# Patient Record
Sex: FEMALE | Race: WHITE | NOT HISPANIC OR LATINO | URBAN - METROPOLITAN AREA
[De-identification: names, ages, dates, MRNs, and addresses within clinical notes are randomized per-mention and may not be internally consistent; named-entity substitution may affect disease eponyms.]

---

## 2018-08-30 ENCOUNTER — HOSPITAL ENCOUNTER (INPATIENT)
Facility: HOSPITAL | Age: 83
LOS: 4 days | DRG: 184 | End: 2018-09-03
Attending: SURGERY | Admitting: SURGERY
Payer: COMMERCIAL
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
## 2018-08-30 PROBLEM — S22.49XA RIB FRACTURES: Status: ACTIVE | Noted: 2018-08-30

## 2018-08-30 LAB
ABO + RH BLD: NORMAL
ALBUMIN SERPL-MCNC: 2.8 G/DL (ref 3.4–5)
ALP SERPL-CCNC: 98 IU/L (ref 35–126)
ALT SERPL-CCNC: 55 IU/L (ref 11–54)
ANION GAP SERPL CALC-SCNC: 8 MEQ/L (ref 3–15)
APTT PPP: 36 SEC. (ref 23–35)
AST SERPL-CCNC: 76 IU/L (ref 15–41)
BACTERIA URNS QL MICRO: ABNORMAL /UL
BILIRUB SERPL-MCNC: 1.4 MG/DL (ref 0.3–1.2)
BILIRUB UR QL STRIP.AUTO: NEGATIVE MG/DL
BLD GP AB SCN SERPL QL: NEGATIVE
BUN SERPL-MCNC: 16 MG/DL (ref 8–20)
CALCIUM SERPL-MCNC: 8.5 MG/DL (ref 8.9–10.3)
CHLORIDE SERPL-SCNC: 104 MMOL/L (ref 98–109)
CLARITY UR REFRACT.AUTO: CLEAR
CO2 SERPL-SCNC: 24 MMOL/L (ref 22–32)
COLOR UR AUTO: ABNORMAL
CREAT SERPL-MCNC: 0.9 MG/DL (ref 0.6–1.1)
D AG BLD QL: POSITIVE
ERYTHROCYTE [DISTWIDTH] IN BLOOD BY AUTOMATED COUNT: 14.3 % (ref 11.7–14.4)
GFR SERPL CREATININE-BSD FRML MDRD: 59.9 ML/MIN/1.73M*2
GLUCOSE SERPL-MCNC: 130 MG/DL (ref 70–99)
GLUCOSE UR STRIP.AUTO-MCNC: NEGATIVE MG/DL
HCT VFR BLDCO AUTO: 35 % (ref 35–45)
HGB BLD-MCNC: 11.7 G/DL (ref 11.8–15.7)
HGB UR QL STRIP.AUTO: NEGATIVE
HYALINE CASTS #/AREA URNS LPF: ABNORMAL /LPF
INR PPP: 5.3 INR
KETONES UR STRIP.AUTO-MCNC: NEGATIVE MG/DL
LABORATORY COMMENT REPORT: NORMAL
LACTATE BLDA-SCNC: 1.5 MMOL/L (ref 0.4–1.6)
LEUKOCYTE ESTERASE UR QL STRIP.AUTO: NEGATIVE
MAGNESIUM SERPL-MCNC: 2.2 MG/DL (ref 1.8–2.5)
MCH RBC QN AUTO: 29.3 PG (ref 28–33.2)
MCHC RBC AUTO-ENTMCNC: 33.4 G/DL (ref 32.2–35.5)
MCV RBC AUTO: 87.5 FL (ref 83–98)
MRSA DNA SPEC QL NAA+PROBE: NEGATIVE
NITRITE UR QL STRIP.AUTO: NEGATIVE
PDW BLD AUTO: 10.3 FL (ref 9.4–12.3)
PH UR STRIP.AUTO: 7 [PH]
PHOSPHATE SERPL-MCNC: 3.5 MG/DL (ref 2.4–4.7)
PLATELET # BLD AUTO: 219 K/UL (ref 150–369)
POTASSIUM SERPL-SCNC: 3.8 MMOL/L (ref 3.6–5.1)
PROT SERPL-MCNC: 5.2 G/DL (ref 6–8.2)
PROT UR QL STRIP.AUTO: 2
PROTHROMBIN TIME: 50.1 SEC. (ref 12.2–14.5)
RBC # BLD AUTO: 4 M/UL (ref 3.93–5.22)
RBC #/AREA URNS HPF: ABNORMAL /HPF
SODIUM SERPL-SCNC: 136 MMOL/L (ref 136–144)
SP GR UR REFRACT.AUTO: 1.02
SQUAMOUS URNS QL MICRO: 1 /HPF
UROBILINOGEN UR STRIP-ACNC: 1 EU/DL
WBC # BLD AUTO: 17.36 K/UL (ref 3.8–10.5)
WBC #/AREA URNS HPF: ABNORMAL /HPF

## 2018-08-30 PROCEDURE — 84100 ASSAY OF PHOSPHORUS: CPT | Performed by: PHYSICIAN ASSISTANT

## 2018-08-30 PROCEDURE — 94640 AIRWAY INHALATION TREATMENT: CPT

## 2018-08-30 PROCEDURE — 83605 ASSAY OF LACTIC ACID: CPT | Performed by: PHYSICIAN ASSISTANT

## 2018-08-30 PROCEDURE — 85610 PROTHROMBIN TIME: CPT | Performed by: PHYSICIAN ASSISTANT

## 2018-08-30 PROCEDURE — 83735 ASSAY OF MAGNESIUM: CPT | Performed by: PHYSICIAN ASSISTANT

## 2018-08-30 PROCEDURE — 85730 THROMBOPLASTIN TIME PARTIAL: CPT | Performed by: PHYSICIAN ASSISTANT

## 2018-08-30 PROCEDURE — 85027 COMPLETE CBC AUTOMATED: CPT | Performed by: PHYSICIAN ASSISTANT

## 2018-08-30 PROCEDURE — 93005 ELECTROCARDIOGRAM TRACING: CPT | Performed by: PHYSICIAN ASSISTANT

## 2018-08-30 PROCEDURE — 63700000 HC SELF-ADMINISTRABLE DRUG

## 2018-08-30 PROCEDURE — 86900 BLOOD TYPING SEROLOGIC ABO: CPT

## 2018-08-30 PROCEDURE — 87086 URINE CULTURE/COLONY COUNT: CPT | Performed by: PHYSICIAN ASSISTANT

## 2018-08-30 PROCEDURE — 25000000 HC PHARMACY GENERAL: Performed by: PHYSICIAN ASSISTANT

## 2018-08-30 PROCEDURE — 81001 URINALYSIS AUTO W/SCOPE: CPT | Performed by: PHYSICIAN ASSISTANT

## 2018-08-30 PROCEDURE — 87641 MR-STAPH DNA AMP PROBE: CPT | Performed by: PHYSICIAN ASSISTANT

## 2018-08-30 PROCEDURE — 63600000 HC DRUGS/DETAIL CODE: Performed by: PHYSICIAN ASSISTANT

## 2018-08-30 PROCEDURE — 80053 COMPREHEN METABOLIC PANEL: CPT | Performed by: PHYSICIAN ASSISTANT

## 2018-08-30 PROCEDURE — 36415 COLL VENOUS BLD VENIPUNCTURE: CPT | Performed by: PHYSICIAN ASSISTANT

## 2018-08-30 PROCEDURE — 20000000 HC ROOM AND CARE ICU

## 2018-08-30 PROCEDURE — 25800000 HC PHARMACY IV SOLUTIONS: Performed by: PHYSICIAN ASSISTANT

## 2018-08-30 RX ORDER — POLYETHYLENE GLYCOL 3350 17 G/17G
17 POWDER, FOR SOLUTION ORAL DAILY
COMMUNITY
End: 2022-08-30

## 2018-08-30 RX ORDER — ACETAMINOPHEN 325 MG/1
650 TABLET ORAL EVERY 6 HOURS
Status: DISCONTINUED | OUTPATIENT
Start: 2018-08-30 | End: 2018-09-02

## 2018-08-30 RX ORDER — ALBUTEROL SULFATE 0.83 MG/ML
2.5 SOLUTION RESPIRATORY (INHALATION) 4 TIMES DAILY
COMMUNITY
End: 2018-09-09

## 2018-08-30 RX ORDER — ONDANSETRON HYDROCHLORIDE 2 MG/ML
4 INJECTION, SOLUTION INTRAVENOUS EVERY 8 HOURS PRN
Status: DISCONTINUED | OUTPATIENT
Start: 2018-08-30 | End: 2018-09-03 | Stop reason: HOSPADM

## 2018-08-30 RX ORDER — HYDROMORPHONE HYDROCHLORIDE 2 MG/1
2 TABLET ORAL EVERY 4 HOURS PRN
Status: DISCONTINUED | OUTPATIENT
Start: 2018-08-30 | End: 2018-09-03 | Stop reason: HOSPADM

## 2018-08-30 RX ORDER — ZOLPIDEM TARTRATE 5 MG/1
5 TABLET ORAL NIGHTLY
COMMUNITY

## 2018-08-30 RX ORDER — HEPARIN SODIUM 5000 [USP'U]/ML
5000 INJECTION, SOLUTION INTRAVENOUS; SUBCUTANEOUS EVERY 8 HOURS
Status: DISCONTINUED | OUTPATIENT
Start: 2018-08-30 | End: 2018-08-30

## 2018-08-30 RX ORDER — TIOTROPIUM BROMIDE 18 UG/1
1 CAPSULE ORAL; RESPIRATORY (INHALATION) DAILY
Status: DISCONTINUED | OUTPATIENT
Start: 2018-08-31 | End: 2018-08-31

## 2018-08-30 RX ORDER — POTASSIUM CHLORIDE 14.9 MG/ML
20 INJECTION INTRAVENOUS AS NEEDED
Status: DISCONTINUED | OUTPATIENT
Start: 2018-08-30 | End: 2018-08-31

## 2018-08-30 RX ORDER — DILTIAZEM HYDROCHLORIDE EXTENDED-RELEASE TABLETS 120 MG/1
120 TABLET, EXTENDED RELEASE ORAL 3 TIMES DAILY
COMMUNITY

## 2018-08-30 RX ORDER — POTASSIUM CHLORIDE 750 MG/1
20 TABLET, FILM COATED, EXTENDED RELEASE ORAL AS NEEDED
Status: DISCONTINUED | OUTPATIENT
Start: 2018-08-30 | End: 2018-09-03

## 2018-08-30 RX ORDER — SODIUM CHLORIDE 9 MG/ML
5 INJECTION, SOLUTION INTRAVENOUS AS NEEDED
Status: DISCONTINUED | OUTPATIENT
Start: 2018-08-30 | End: 2018-08-31

## 2018-08-30 RX ORDER — DEXTROSE 50 % IN WATER (D50W) INTRAVENOUS SYRINGE
25 AS NEEDED
Status: DISCONTINUED | OUTPATIENT
Start: 2018-08-30 | End: 2018-09-03 | Stop reason: HOSPADM

## 2018-08-30 RX ORDER — ROSUVASTATIN CALCIUM 40 MG/1
40 TABLET, COATED ORAL NIGHTLY
COMMUNITY
End: 2022-09-02 | Stop reason: HOSPADM

## 2018-08-30 RX ORDER — ROSUVASTATIN CALCIUM 40 MG/1
40 TABLET, COATED ORAL DAILY
Status: DISCONTINUED | OUTPATIENT
Start: 2018-08-31 | End: 2018-09-03 | Stop reason: HOSPADM

## 2018-08-30 RX ORDER — DEXTROSE 40 %
15-30 GEL (GRAM) ORAL AS NEEDED
Status: DISCONTINUED | OUTPATIENT
Start: 2018-08-30 | End: 2018-08-31

## 2018-08-30 RX ORDER — FENTANYL CITRATE 50 UG/ML
25 INJECTION, SOLUTION INTRAMUSCULAR; INTRAVENOUS ONCE
Status: COMPLETED | OUTPATIENT
Start: 2018-08-30 | End: 2018-08-30

## 2018-08-30 RX ORDER — POTASSIUM CHLORIDE 750 MG/1
40 TABLET, FILM COATED, EXTENDED RELEASE ORAL AS NEEDED
Status: DISCONTINUED | OUTPATIENT
Start: 2018-08-30 | End: 2018-09-03

## 2018-08-30 RX ORDER — FENTANYL CITRATE 50 UG/ML
INJECTION, SOLUTION INTRAMUSCULAR; INTRAVENOUS
Status: COMPLETED
Start: 2018-08-30 | End: 2018-08-30

## 2018-08-30 RX ORDER — POLYETHYLENE GLYCOL 3350 17 G/17G
17 POWDER, FOR SOLUTION ORAL DAILY
Status: DISCONTINUED | OUTPATIENT
Start: 2018-08-31 | End: 2018-09-03 | Stop reason: HOSPADM

## 2018-08-30 RX ORDER — LORATADINE 10 MG/1
10 TABLET ORAL DAILY
Status: DISCONTINUED | OUTPATIENT
Start: 2018-08-31 | End: 2018-09-03 | Stop reason: HOSPADM

## 2018-08-30 RX ORDER — IBUPROFEN 200 MG
16-32 TABLET ORAL AS NEEDED
Status: DISCONTINUED | OUTPATIENT
Start: 2018-08-30 | End: 2018-08-31

## 2018-08-30 RX ORDER — WARFARIN 2.5 MG/1
2.5 TABLET ORAL EVERY EVENING
COMMUNITY

## 2018-08-30 RX ORDER — AMOXICILLIN 250 MG
1 CAPSULE ORAL 2 TIMES DAILY
Status: DISCONTINUED | OUTPATIENT
Start: 2018-08-30 | End: 2018-09-03 | Stop reason: HOSPADM

## 2018-08-30 RX ORDER — ACETAMINOPHEN 325 MG/1
TABLET ORAL
Status: COMPLETED
Start: 2018-08-30 | End: 2018-08-30

## 2018-08-30 RX ORDER — TIOTROPIUM BROMIDE 18 UG/1
1 CAPSULE ORAL; RESPIRATORY (INHALATION) DAILY
COMMUNITY
End: 2022-08-30 | Stop reason: ALTCHOICE

## 2018-08-30 RX ORDER — FLUTICASONE FUROATE AND VILANTEROL 100; 25 UG/1; UG/1
1 POWDER RESPIRATORY (INHALATION) DAILY
COMMUNITY
End: 2022-08-30 | Stop reason: ALTCHOICE

## 2018-08-30 RX ORDER — ALBUTEROL SULFATE 0.83 MG/ML
2.5 SOLUTION RESPIRATORY (INHALATION) 4 TIMES DAILY
Status: DISCONTINUED | OUTPATIENT
Start: 2018-08-30 | End: 2018-09-03 | Stop reason: HOSPADM

## 2018-08-30 RX ORDER — LIDOCAINE 560 MG/1
1 PATCH PERCUTANEOUS; TOPICAL; TRANSDERMAL DAILY
Status: DISCONTINUED | OUTPATIENT
Start: 2018-08-31 | End: 2018-09-03 | Stop reason: HOSPADM

## 2018-08-30 RX ORDER — FLUTICASONE FUROATE AND VILANTEROL 100; 25 UG/1; UG/1
1 POWDER RESPIRATORY (INHALATION) DAILY
Status: DISCONTINUED | OUTPATIENT
Start: 2018-08-31 | End: 2018-09-03 | Stop reason: HOSPADM

## 2018-08-30 RX ORDER — HYDROMORPHONE HYDROCHLORIDE 4 MG/1
4 TABLET ORAL EVERY 4 HOURS PRN
Status: DISCONTINUED | OUTPATIENT
Start: 2018-08-30 | End: 2018-09-03 | Stop reason: HOSPADM

## 2018-08-30 RX ORDER — MINERAL OIL
180 ENEMA (ML) RECTAL DAILY
COMMUNITY
End: 2022-08-30

## 2018-08-30 RX ORDER — ZOLPIDEM TARTRATE 5 MG/1
5 TABLET ORAL NIGHTLY PRN
Status: DISCONTINUED | OUTPATIENT
Start: 2018-08-30 | End: 2018-09-03 | Stop reason: HOSPADM

## 2018-08-30 RX ADMIN — FENTANYL CITRATE 25 MCG: 50 INJECTION INTRAMUSCULAR; INTRAVENOUS at 20:51

## 2018-08-30 RX ADMIN — ACETAMINOPHEN 650 MG: 325 TABLET ORAL at 19:41

## 2018-08-30 RX ADMIN — SODIUM CHLORIDE 1000 ML: 9 INJECTION, SOLUTION INTRAVENOUS at 23:56

## 2018-08-30 RX ADMIN — ALBUTEROL SULFATE 2.5 MG: 2.5 SOLUTION RESPIRATORY (INHALATION) at 22:24

## 2018-08-30 ASSESSMENT — COGNITIVE AND FUNCTIONAL STATUS - GENERAL: DO YOU HAVE SERIOUS DIFFICULTY WALKING OR CLIMBING STAIRS: AMBULATION DIFFICULTY, REQUIRES EQUIPMENT

## 2018-08-31 ENCOUNTER — APPOINTMENT (INPATIENT)
Dept: RADIOLOGY | Facility: HOSPITAL | Age: 83
DRG: 184 | End: 2018-08-31
Attending: PHYSICIAN ASSISTANT
Payer: COMMERCIAL

## 2018-08-31 LAB
ABO + RH BLD: NORMAL
ANION GAP SERPL CALC-SCNC: 12 MEQ/L (ref 3–15)
APTT PPP: 29 SEC. (ref 23–35)
ATRIAL RATE: 71
ATRIAL RATE: 73
BUN SERPL-MCNC: 11 MG/DL (ref 8–20)
CALCIUM SERPL-MCNC: 8.6 MG/DL (ref 8.9–10.3)
CHLORIDE SERPL-SCNC: 104 MMOL/L (ref 98–109)
CO2 SERPL-SCNC: 23 MMOL/L (ref 22–32)
CREAT SERPL-MCNC: 0.9 MG/DL (ref 0.6–1.1)
D AG BLD QL: POSITIVE
ERYTHROCYTE [DISTWIDTH] IN BLOOD BY AUTOMATED COUNT: 14.6 % (ref 11.7–14.4)
GFR SERPL CREATININE-BSD FRML MDRD: 59.9 ML/MIN/1.73M*2
GLUCOSE SERPL-MCNC: 125 MG/DL (ref 70–99)
HCT VFR BLDCO AUTO: 38.2 % (ref 35–45)
HGB BLD-MCNC: 12.2 G/DL (ref 11.8–15.7)
INR PPP: 1.4 INR
INR PPP: 1.7 INR
INR PPP: 5.2 INR
LABORATORY COMMENT REPORT: NORMAL
MAGNESIUM SERPL-MCNC: 2.3 MG/DL (ref 1.8–2.5)
MCH RBC QN AUTO: 28.5 PG (ref 28–33.2)
MCHC RBC AUTO-ENTMCNC: 31.9 G/DL (ref 32.2–35.5)
MCV RBC AUTO: 89.3 FL (ref 83–98)
P AXIS: 41
P AXIS: 50
PDW BLD AUTO: 11 FL (ref 9.4–12.3)
PHOSPHATE SERPL-MCNC: 3.5 MG/DL (ref 2.4–4.7)
PLATELET # BLD AUTO: 203 K/UL (ref 150–369)
POTASSIUM SERPL-SCNC: 4.2 MMOL/L (ref 3.6–5.1)
PR INTERVAL: 152
PR INTERVAL: 158
PROTHROMBIN TIME: 16.8 SEC. (ref 12.2–14.5)
PROTHROMBIN TIME: 19.9 SEC. (ref 12.2–14.5)
PROTHROMBIN TIME: 49.2 SEC. (ref 12.2–14.5)
QRS DURATION: 110
QRS DURATION: 116
QT INTERVAL: 396
QT INTERVAL: 426
QTC CALCULATION(BAZETT): 436
QTC CALCULATION(BAZETT): 462
R AXIS: 36
R AXIS: 39
RBC # BLD AUTO: 4.28 M/UL (ref 3.93–5.22)
SODIUM SERPL-SCNC: 139 MMOL/L (ref 136–144)
T WAVE AXIS: 79
T WAVE AXIS: 80
TROPONIN I SERPL-MCNC: <0.03 NG/ML
VENTRICULAR RATE: 71
VENTRICULAR RATE: 73
WBC # BLD AUTO: 15.25 K/UL (ref 3.8–10.5)

## 2018-08-31 PROCEDURE — 93005 ELECTROCARDIOGRAM TRACING: CPT | Performed by: SURGERY

## 2018-08-31 PROCEDURE — 74177 CT ABD & PELVIS W/CONTRAST: CPT

## 2018-08-31 PROCEDURE — 25000000 HC PHARMACY GENERAL: Performed by: PHYSICIAN ASSISTANT

## 2018-08-31 PROCEDURE — 36430 TRANSFUSION BLD/BLD COMPNT: CPT

## 2018-08-31 PROCEDURE — 94640 AIRWAY INHALATION TREATMENT: CPT

## 2018-08-31 PROCEDURE — 63600105 HC IODINE BASED CONTRAST: Performed by: PHYSICIAN ASSISTANT

## 2018-08-31 PROCEDURE — P9059 PLASMA, FRZ BETWEEN 8-24HOUR: HCPCS

## 2018-08-31 PROCEDURE — 36415 COLL VENOUS BLD VENIPUNCTURE: CPT | Performed by: PHYSICIAN ASSISTANT

## 2018-08-31 PROCEDURE — 25800000 HC PHARMACY IV SOLUTIONS: Performed by: PHYSICIAN ASSISTANT

## 2018-08-31 PROCEDURE — 63700000 HC SELF-ADMINISTRABLE DRUG: Performed by: PHYSICIAN ASSISTANT

## 2018-08-31 PROCEDURE — 85610 PROTHROMBIN TIME: CPT | Performed by: PHYSICIAN ASSISTANT

## 2018-08-31 PROCEDURE — 85730 THROMBOPLASTIN TIME PARTIAL: CPT | Performed by: PHYSICIAN ASSISTANT

## 2018-08-31 PROCEDURE — 63600000 HC DRUGS/DETAIL CODE: Performed by: PHYSICIAN ASSISTANT

## 2018-08-31 PROCEDURE — 71045 X-RAY EXAM CHEST 1 VIEW: CPT

## 2018-08-31 PROCEDURE — 63600000 HC DRUGS/DETAIL CODE

## 2018-08-31 PROCEDURE — 63700000 HC SELF-ADMINISTRABLE DRUG: Performed by: NURSE PRACTITIONER

## 2018-08-31 PROCEDURE — 84100 ASSAY OF PHOSPHORUS: CPT | Performed by: PHYSICIAN ASSISTANT

## 2018-08-31 PROCEDURE — 85027 COMPLETE CBC AUTOMATED: CPT | Performed by: PHYSICIAN ASSISTANT

## 2018-08-31 PROCEDURE — 83735 ASSAY OF MAGNESIUM: CPT | Performed by: PHYSICIAN ASSISTANT

## 2018-08-31 PROCEDURE — 84484 ASSAY OF TROPONIN QUANT: CPT | Performed by: PHYSICIAN ASSISTANT

## 2018-08-31 PROCEDURE — 97162 PT EVAL MOD COMPLEX 30 MIN: CPT | Mod: GP

## 2018-08-31 PROCEDURE — 20000000 HC ROOM AND CARE ICU

## 2018-08-31 PROCEDURE — 80048 BASIC METABOLIC PNL TOTAL CA: CPT | Performed by: PHYSICIAN ASSISTANT

## 2018-08-31 PROCEDURE — 25000000 HC PHARMACY GENERAL

## 2018-08-31 PROCEDURE — 97165 OT EVAL LOW COMPLEX 30 MIN: CPT | Mod: GO

## 2018-08-31 RX ORDER — FENTANYL CITRATE 50 UG/ML
INJECTION, SOLUTION INTRAMUSCULAR; INTRAVENOUS
Status: COMPLETED
Start: 2018-08-31 | End: 2018-08-31

## 2018-08-31 RX ORDER — WARFARIN 2.5 MG/1
2.5 TABLET ORAL
Status: DISCONTINUED | OUTPATIENT
Start: 2018-09-01 | End: 2018-09-01

## 2018-08-31 RX ORDER — HYDRALAZINE HYDROCHLORIDE 20 MG/ML
10 INJECTION INTRAMUSCULAR; INTRAVENOUS ONCE
Status: COMPLETED | OUTPATIENT
Start: 2018-08-31 | End: 2018-08-31

## 2018-08-31 RX ORDER — FENTANYL CITRATE 50 UG/ML
25 INJECTION, SOLUTION INTRAMUSCULAR; INTRAVENOUS ONCE
Status: COMPLETED | OUTPATIENT
Start: 2018-08-31 | End: 2018-08-31

## 2018-08-31 RX ORDER — DILTIAZEM HYDROCHLORIDE 180 MG/1
360 CAPSULE, COATED, EXTENDED RELEASE ORAL DAILY
Status: DISCONTINUED | OUTPATIENT
Start: 2018-08-31 | End: 2018-08-31

## 2018-08-31 RX ORDER — DILTIAZEM HYDROCHLORIDE 180 MG/1
360 CAPSULE, COATED, EXTENDED RELEASE ORAL DAILY
Status: DISCONTINUED | OUTPATIENT
Start: 2018-09-01 | End: 2018-09-03 | Stop reason: HOSPADM

## 2018-08-31 RX ORDER — SODIUM CHLORIDE 9 MG/ML
5 INJECTION, SOLUTION INTRAVENOUS AS NEEDED
Status: DISCONTINUED | OUTPATIENT
Start: 2018-08-31 | End: 2018-08-31

## 2018-08-31 RX ORDER — WARFARIN SODIUM 5 MG/1
5 TABLET ORAL 3 TIMES WEEKLY
Status: COMPLETED | OUTPATIENT
Start: 2018-08-31 | End: 2018-08-31

## 2018-08-31 RX ORDER — TIOTROPIUM BROMIDE 18 UG/1
1 CAPSULE ORAL; RESPIRATORY (INHALATION) DAILY
Status: DISCONTINUED | OUTPATIENT
Start: 2018-08-31 | End: 2018-09-03 | Stop reason: HOSPADM

## 2018-08-31 RX ORDER — LORAZEPAM 2 MG/ML
1 INJECTION INTRAMUSCULAR
Status: DISCONTINUED | OUTPATIENT
Start: 2018-08-31 | End: 2018-09-03

## 2018-08-31 RX ORDER — LABETALOL HYDROCHLORIDE 5 MG/ML
INJECTION, SOLUTION INTRAVENOUS
Status: COMPLETED
Start: 2018-08-31 | End: 2018-08-31

## 2018-08-31 RX ORDER — LABETALOL HYDROCHLORIDE 5 MG/ML
10 INJECTION, SOLUTION INTRAVENOUS ONCE
Status: COMPLETED | OUTPATIENT
Start: 2018-08-31 | End: 2018-08-31

## 2018-08-31 RX ADMIN — Medication 2.5 MG/HR: at 05:30

## 2018-08-31 RX ADMIN — ZOLPIDEM TARTRATE 5 MG: 5 TABLET, FILM COATED ORAL at 20:54

## 2018-08-31 RX ADMIN — ALBUTEROL SULFATE 2.5 MG: 2.5 SOLUTION RESPIRATORY (INHALATION) at 20:28

## 2018-08-31 RX ADMIN — PHYTONADIONE 3 MG: 10 INJECTION, EMULSION INTRAMUSCULAR; INTRAVENOUS; SUBCUTANEOUS at 02:26

## 2018-08-31 RX ADMIN — HYDROMORPHONE HYDROCHLORIDE 2 MG: 2 TABLET ORAL at 21:20

## 2018-08-31 RX ADMIN — FENTANYL CITRATE 25 MCG: 50 INJECTION INTRAMUSCULAR; INTRAVENOUS at 04:58

## 2018-08-31 RX ADMIN — HYDROMORPHONE HYDROCHLORIDE 4 MG: 4 TABLET ORAL at 03:52

## 2018-08-31 RX ADMIN — ALBUTEROL SULFATE 2.5 MG: 2.5 SOLUTION RESPIRATORY (INHALATION) at 17:48

## 2018-08-31 RX ADMIN — ACETAMINOPHEN 650 MG: 325 TABLET ORAL at 09:30

## 2018-08-31 RX ADMIN — LABETALOL HYDROCHLORIDE 10 MG: 5 INJECTION INTRAVENOUS at 03:14

## 2018-08-31 RX ADMIN — IOHEXOL 125 ML: 300 INJECTION, SOLUTION INTRAVENOUS at 01:47

## 2018-08-31 RX ADMIN — ROSUVASTATIN CALCIUM 40 MG: 40 TABLET, FILM COATED ORAL at 09:34

## 2018-08-31 RX ADMIN — ALBUTEROL SULFATE 2.5 MG: 2.5 SOLUTION RESPIRATORY (INHALATION) at 08:29

## 2018-08-31 RX ADMIN — HYDROMORPHONE HYDROCHLORIDE 4 MG: 4 TABLET ORAL at 09:34

## 2018-08-31 RX ADMIN — ACETAMINOPHEN 650 MG: 325 TABLET ORAL at 20:53

## 2018-08-31 RX ADMIN — ACETAMINOPHEN 650 MG: 325 TABLET ORAL at 15:40

## 2018-08-31 RX ADMIN — FLUTICASONE FUROATE AND VILANTEROL TRIFENATATE 1 PUFF: 100; 25 POWDER RESPIRATORY (INHALATION) at 08:29

## 2018-08-31 RX ADMIN — ONDANSETRON 4 MG: 2 INJECTION, SOLUTION INTRAMUSCULAR; INTRAVENOUS at 17:56

## 2018-08-31 RX ADMIN — ALBUTEROL SULFATE 2.5 MG: 2.5 SOLUTION RESPIRATORY (INHALATION) at 13:30

## 2018-08-31 RX ADMIN — LABETALOL HYDROCHLORIDE 10 MG: 5 INJECTION, SOLUTION INTRAVENOUS at 03:14

## 2018-08-31 RX ADMIN — HYDROMORPHONE HYDROCHLORIDE 2 MG: 2 TABLET ORAL at 00:06

## 2018-08-31 RX ADMIN — SENNOSIDES AND DOCUSATE SODIUM 1 TABLET: 8.6; 5 TABLET ORAL at 19:35

## 2018-08-31 RX ADMIN — HYDRALAZINE HYDROCHLORIDE 10 MG: 20 INJECTION INTRAMUSCULAR; INTRAVENOUS at 04:21

## 2018-08-31 RX ADMIN — WARFARIN SODIUM 5 MG: 5 TABLET ORAL at 19:36

## 2018-08-31 RX ADMIN — FENTANYL CITRATE 25 MCG: 50 INJECTION, SOLUTION INTRAMUSCULAR; INTRAVENOUS at 04:58

## 2018-08-31 RX ADMIN — TIOTROPIUM BROMIDE 1 CAPSULE: 18 CAPSULE ORAL; RESPIRATORY (INHALATION) at 02:52

## 2018-08-31 RX ADMIN — HYDROMORPHONE HYDROCHLORIDE 4 MG: 4 TABLET ORAL at 14:25

## 2018-08-31 RX ADMIN — LIDOCAINE 1 PATCH: 246 PATCH TOPICAL at 09:33

## 2018-08-31 ASSESSMENT — COGNITIVE AND FUNCTIONAL STATUS - GENERAL
MOVING TO AND FROM BED TO CHAIR: 3 - A LITTLE
EATING MEALS: 4 - NONE
TOILETING: 3 - A LITTLE
CLIMB 3 TO 5 STEPS WITH RAILING: 3 - A LITTLE
DRESSING REGULAR UPPER BODY CLOTHING: 3 - A LITTLE
DRESSING REGULAR LOWER BODY CLOTHING: 3 - A LITTLE
WALKING IN HOSPITAL ROOM: 3 - A LITTLE
STANDING UP FROM CHAIR USING ARMS: 3 - A LITTLE
HELP NEEDED FOR BATHING: 3 - A LITTLE
HELP NEEDED FOR PERSONAL GROOMING: 3 - A LITTLE

## 2018-08-31 NOTE — PROGRESS NOTES
Spoke with pt's Cardiologist's (Dr. Bailey [phone]) office and RN, Siri.  Pt was recently seen at [facility] on 8/20/2018 for CP and SOB.  Her INR was 4.6 then.  Her goal with her mech valve is 2.5 - 3.5.  She has it checked monthly and as per the office has been regimen with it.  She has not had difficulty with supratherapeutuc INRs and no hx of frequent falls as per their knowledge.  They have faxed over her INR monthly  records which will be placed on her chart for reference.  They did request upon DC to SNF the SNF check her  INR  and fax to Dr. Bailey's office and to keep her regularly scheduled appointments.

## 2018-08-31 NOTE — CONSULTS
Physical Medicine and Rehabilitation Consult Note    Subjective     Bonny Howe is a 83 y.o. female who was admitted for Rib fractures [S22.39XA]. We were asked to see for rehabilitation needs.     Patient is a 83-year-old woman with a past medical history significant for hypertension, hyperlipidemia, aortic valve replacement, chronic anticoagulation, COPD, bilateral total hip replacements and irritable bowel syndrome.She was transferred to the [facility] on 8-30-18 from an outside hospital status post a down several stairs at home.  She landed on her left side describes it as a mechanical fall without loss of consciousness.  Diagnostic workup remarkable for multiple left-sided rib fractures numbers 2 through 7 with ribs 4 and 5 with 2 fractures each.  Chronic appearing T8 compression fracture as well.  Currently being treated conservatively, nonsurgically.    Medical History: No past medical history on file.    Surgical History: No past surgical history on file.    Social History:   Social History     Social History Narrative   • No narrative on file     Lives with:   Her daughter in a multilevel dwelling.  Her  with Alzheimer's is there as well.  He has a home health aide from 9 PM to 9 AM daily  Prior Function Level: Function Level Prior  Ambulation: independent  Transferring: independent  Toileting: independent  Bathing: independent  Dressing: independent  Eating: independent  Communication: understands/communicates without difficulty  Swallowing: swallows foods/liquids without difficulty  Equipment Currently Used at Home: none  Family History: No family history on file.  History also provided by: Nursing staff  Allergies: Codeine and Penicillins      acetaminophen 650 mg oral q6h SULTANA   albuterol 2.5 mg nebulization QID   fluticasone-vilanterol 1 puff inhalation Daily   lidocaine 1 patch Topical Daily   loratadine 10 mg oral Daily   polyethylene glycol 17 g oral Daily   rosuvastatin 40 mg  oral Daily   sennosides-docusate sodium 1 tablet oral BID   tiotropium 1 capsule inhalation Daily        Medication List      CONTINUE taking these medications    albuterol 2.5 mg /3 mL (0.083 %) nebulizer solution  Take 2.5 mg by nebulization 4 (four) times a day.     diltiazem  mg 24 hr tablet  Commonly known as:  CARDIZEM LA  Take 360 mg by mouth daily.     fexofenadine 180 mg tablet  Commonly known as:  ALLEGRA  Take 180 mg by mouth daily.     fluticasone-vilanterol 100-25 mcg/dose powder for inhalation  Commonly known as:  BREO ELLIPTA  Inhale 1 puff daily. Rinse mouth with water after use to reduce aftertaste and incidence of candidiasis. Do not swallow.     polyethylene glycol 17 gram packet  Commonly known as:  MIRALAX  Take 17 g by mouth daily.     rosuvastatin 40 mg tablet  Commonly known as:  CRESTOR  Take 40 mg by mouth daily.     tiotropium 18 mcg per inhalation capsule  Commonly known as:  SPIRIVA  Place 1 capsule into inhaler and inhale daily.     warfarin 2.5 mg tablet  Commonly known as:  COUMADIN  Take 2.5 mg by mouth once daily. 5mg every Monday, Wednesday, Friday; takes 2.5mg all other days.     zolpidem 5 mg tablet  Commonly known as:  AMBIEN  Take 12.5 mg by mouth nightly as needed for sleep.            REVIEW OF SYSTEMS:  CONSTITUTIONAL: Admits to poor appetite and weight loss  PULMONARY: Status post tobacco  RHEUMATOLOGIC: No joint pain currently no back pain positive left-sided posterior chest wall pain  NEUROLOGIC: Denies numbness and tingling  PSYCHIATRIC: Positive history of daily EtOH    Remainder of eleven point review of systems is unremarkable.      Objective   Labs  I have reviewed the patient's labs.  Significant abnormals are Elevated LFTs.  Lab Results   Component Value Date    WBC 15.25 (H) 08/31/2018    HGB 12.2 08/31/2018    HCT 38.2 08/31/2018     08/31/2018    ALT 55 (H) 08/30/2018    AST 76 (H) 08/30/2018     08/31/2018    K 4.2 08/31/2018      08/31/2018    CREATININE 0.9 08/31/2018    BUN 11 08/31/2018    CO2 23 08/31/2018    INR 1.7 08/31/2018     Imaging  Chest x-ray one view is reviewed remarkable for mild left lower lobe consolidation and small left pleural effusion.  Nondisplaced left rib fracture    PHYSICAL EXAM: 143 pounds, BMI is 27  Vitals:    08/31/18 0800   BP: (!) 161/69   Pulse: 92   Resp: 16   Temp: 36.6 °C (97.8 °F)   SpO2: 92%     GENERAL: Well-developed frail woman in no acute distress mildly disheveled                                                                                                                           HENT: Normocephalic atraumatic moist mucous membranes sclera nonicteric  LUNGS: Clear respirations unlabored , mildly decreased left lower  CARDIAC: Irregularly irregular rhythm no gallops or rubs no JVD no pedal edema  ABDOMEN: Soft nontender nondistended positive bowel sounds  EXTREMITIES: No atrophy no increased tone  RHEUMATOLOGIC: No joint effusion no warmth  SPINE: Mild dorsal kyphosis, no significant tenderness in the midline of the thoracic spine.  Positive tenderness to palpation of the posterior thorax.  DERMATOLOGIC: No rash no breakdown  PSYCHIATRIC: Affect is appropriate she is cooperative  NEUROLOGIC: Alert and oriented ×3, cranial nerves are symmetrical, cerebellar is intact, sensation is intact, deep tendon reflexes are symmetrical.  Motor examination 5/5.  MOBILITY: Moderate assistance with bed mobility, poor sitting tolerance secondary to pain    ASSESSMENT/PLAN:    1.  Fall, mechanical in nature with secondary left-sided rib fractures.  Currently being treated conservatively, nonsurgically.  Pain is controlled at rest however significant with bed mobility.    2.  COPD, status post recent acute exacerbation with hospitalization approximately 2 weeks ago.  Currently being treated with albuterol nebulizer, Spiriva.  Stable O2 saturations on nasal cannula O2.    3.  Chronic atrial fibrillation,  chronically anticoagulated with Coumadin.  At admission  with elevated INR and LFTs, she is status post vitamin K and FFP, anticoagulation is on hold.  She is rate controlled at rest but with bed mobility as more rapid ventricular response likely associated pain.    4.  Mobility deficit, acute secondary to pain.  Historically her functional mobility was good and she did not require an assistive device.  She should be mobilized steadily    5.  Rehabilitation/disposition, anticipate need for skilled rehabilitation prior to discharge home with her elderly  with dementia.        No new Assessment & Plan notes have been filed under this hospital service since the last note was generated.  Service: Physical Medicine and Rehabilitation      Plan of care was discussed with patient

## 2018-08-31 NOTE — PROGRESS NOTES
"Brief Nutrition Note    Recommendations   Add ensure compact BID    Nutrition Charting Type: Nutrition Brief Assessment    Clinical Course: Patient is a 83 y.o. female who was admitted on 8/30/2018 with a diagnosis of Rib fractures [S22.39XA].     No past medical history on file.  No past surgical history on file.    General Information  Nutrition Evaluation/Patient Follow-Up: Mildly Nutritionally Compromised-Follow up in 4-6 days     MST Nutrition Screen Tool  Has patient lost weight without trying?: 0-->Yes  If yes,how much weight has been lost?: 1-->2-13 pounds  Has patient been eating poorly due to decreased appetite?: 1-->Yes  MST Nutrition Screen Score: 2     Physical Appearance  Last Bowel Movement: 08/30/18 (per pt)     Nutrition Order Review  Nutrition Order Review: meets nutritional requirements  Nutrition Order Review Comments: regular      Anthropometrics  Height: 154.9 cm (5' 1\")     Current Weight  Weight: 59.3 kg (130 lb 11.7 oz)     Ideal Body Weight (IBW)  Ideal Body Weight (IBW) (kg): 48.15  % Ideal Body Weight: 123.17     Body Mass Index (BMI)  BMI (Calculated): 24.7     Lab Results  Lab Results Reviewed: reviewed, pertinent   BMP Results       08/31/18 08/30/18                       0501 2016           136          K 4.2 3.8          Cl 104 104          CO2 23 24          Glucose 125 (H) 130 (H)          BUN 11 16          Creatinine 0.9 0.9          Calcium 8.6 (L) 8.5 (L)           Pertinent Medications  Pertinent Medications Reviewed: reviewed, pertinent   Miralax, crestor, senokot     Skin: intact     Clinical comments:  Pts diet just advanced, waiting for breakfast, Reports appetite fair lately, just ill with COPD. Thinks she has had a 5lb/ 3% wt loss over past month or so. Agree with regular diet. Recommend ensure compact BID     Monitor: PO intake, plan of care  Goals: meet 75% of needs via oral intake       Recommendations: See above       Date: 08/31/18  Signature: Camryn " DEIDRA Rojo

## 2018-08-31 NOTE — PLAN OF CARE
Problem: Patient Care Overview  Goal: Plan of Care Review  Outcome: Ongoing (interventions implemented as appropriate)   08/31/18 6520   Coping/Psychosocial   Plan Of Care Reviewed With patient   Plan of Care Review   Progress improving   Outcome Summary Cardene off; O2 titrated to 1 L; OOB to Chair.

## 2018-08-31 NOTE — PLAN OF CARE
Problem: Patient Care Overview  Goal: Plan of Care Review  Outcome: Ongoing (interventions implemented as appropriate)   08/31/18 1428   Coping/Psychosocial   Plan Of Care Reviewed With patient   Plan of Care Review   Progress progress toward functional goals as expected   Outcome Summary OT evaluation, expect steady progress as pt to return home w/ home health and family to assist as needed.        Problem: Self-Care Deficit (Adult,Obstetrics,Pediatric)  Goal: Identify Related Risk Factors and Signs and Symptoms  Outcome: Outcome(s) Achieved Date Met: 08/31/18 08/31/18 1428   Self-Care Deficit   Related Risk Factors (Self-Care Deficit) activity intolerance;fatigue/weakness   Signs and Symptoms (Self-Care Deficit) decreased functional activity tolerance;painful movement;inability/unwillingness to perform self-care;unable to perform dressing/grooming tasks;unable to perform toileting/toilet hygiene tasks;unable to perform bathing/hygiene tasks       Problem: Acute Therapy Services Goal & Intervention Plan  Goal: Bathing Goal  Outcome: Ongoing (interventions implemented as appropriate)    Goal: Grooming Goal  Outcome: Ongoing (interventions implemented as appropriate)    Goal: Lower Body Dressing Goal  Outcome: Ongoing (interventions implemented as appropriate)    Goal: Transfer Training Goal  Outcome: Ongoing (interventions implemented as appropriate)    Goal: Upper Body Dressing Goal  Outcome: Ongoing (interventions implemented as appropriate)

## 2018-08-31 NOTE — PROGRESS NOTES
"Trauma and SICU attending:    Patient seen and evaluated.  Data labs imaging reviewed.    Due to the potential for acute decompensation specifically cardiopulmonary related, ICU level of care continues.    Neuro: Awake alert and interactive.  No focal neurologic deficit.    Cardiovascular: History of apparent mechanical aortic valve replacement, and with supra therapeutic coagulopathy at admission (INR 5.3).  Given concern for hemorrhage at site of extensive left chest trauma, as well as potential for visceral injury otherwise, small dose of vitamin K and FFP indicated trying to bring the supratherapeutic coagulopathy into a more therapeutic range.  Her INR in fact near normalized after, and without apparent complication.  If her hemodynamics, and hemoglobin remain stable, Coumadin to be resumed this evening.  We have communicated with her primary care physician, who had managed her Coumadin prior, specifically to apprise him of the variance in the INR checked by him last week (2.5 reportedly) and compared to ours hours at admission (5.3).    Respiratory: Acute respiratory insufficiency from blunt chest trauma with multiple left-sided rib fractures.  No indication of hemopneumothorax. CT done at her transferring facility and unable to be viewed in \"lung\" windows for lung parenchyma assessment.  Nevertheless her chest x-ray appears to show an underlying pulmonary contusion as well.  Titrated FiO2 , pulmonary recruitment maneuvers, ISP use, and multimodal analgesia appropriate to avoid decompensation.  Of note, no indication for rib fixation at this time however this needs to be reconsidered based on respiratory status.  Separately but also complicating care significnatly, is the patient's baseline COPD, and current pre-admission COPD exacerbation.  We have continued her respiratory related medications including her recent pulse steroids.  Pulmonary consultation will be effected if COPD related respiratory " insufficiency does not improve.    GI: Suspected chronic protein calorie malnutrition, with enteral intake appropriate monitor.  No indication of visceral injury on exam or CT imaging.    : Stable renal function.  Glucose and electrolytes ensured appropriate as well.    Heme: Serial hemoglobin planned to ensure stability and see complexity to case as discussed above, with supratherapeutic INR and now status post notable blunt chest trauma.  Platelet count stable.  Supratherapeutic acquired coagulopathy from Coumadin as discussed above.    I provided 35 minutes of critical care services to support this life/organ threatening illness. This time reflects daily cumulative, direct time spent in the care and direct coordination of care of the patient throughout the day by the attending physician, excluding separately billable services, procedures, teaching time and time spent by non-physician providers.

## 2018-08-31 NOTE — PLAN OF CARE
Problem: Patient Care Overview  Goal: Discharge Needs Assessment   08/31/18 1698   NC Needs Assessment   Concerns To Be Addressed discharge planning concerns   Readmission Within The Last 30 Days no previous admission in last 30 days   Anticipated Discharge Disposition home with home health services   Type of Home Care Services home PT   Equipment Needed After Discharge none   Current Health   Anticipated Changes Related to Illness inability to care for self   Activity/Self Care ROS   Equipment Currently Used at Home walker, rolling

## 2018-08-31 NOTE — PROGRESS NOTES
"As per MD Gomez, pt admitted with multiple L sided rib fractures, and COPD exacerbation. Anticipate pt medically stable for dc within 1-2 days. PT/OT evaluated pt and recommend home with home care when stable. SW consulted for trauma.     SW met with pt and pt dtr/Sal at bedside. Pt reports she tripped at home walking down 3 steps. Pt denies ETOH or illicit substance use. Pt reports she resides with her  and her dtr in a H. Pt and pt spouse have a first floor set up with no ANAYA. Pt uses a walker at home and is primarily independent with all ADLs.     Pt confirmed PCP: Dr. Parker Rodriguez; Pharmacy: [facility]. Pt does not have a HCPOA but states her dtr/Anna Mccormack p: [phone] to be contacted in case of an emergency. Pt reports she feels safe returning home and is \"unsure\" if she wants home care. Requests SW revisit this prior to dc.  Emotional support provided. Pt reports family will transport home. SW continues to follow for emotional support and dispo planning. Camryn Montenegro, JAY   "

## 2018-08-31 NOTE — H&P
TRAUMA SURGERY CONSULT     PATIENT NAME:  Bonny Howe YOB: 1935    AGE:  83 y.o.  GENDER: female   MRN:  [id number]  PATIENT #: [id number]       CHIEF COMPLAINT: Left sided chest wall pain    Time Notified: 18:45 Time Patient Seen: 19:50     HISTORY OF INJURY   Patient is an 82 yo F transferred from [facility] for left sided rib fractures (2-7; fractures in 2 placed in ribs 4-5). Patient was coming down 3 steps when she tripped and fell. She did not hit her head and denies LOC. She landed on her left chest wall. Since fall she has had left sided chest wall pain for which she presented to [facility] and was found to have multiple left sided rib fractures. She has a history of COPD and was currently being treated with a prednisone taper for a flare up on Sunday.     REVIEW OF SYSTEMS   13 point review of systems completed. Negative except for above mentioned history.    PAST MEDICAL HISTORY   COPD -> recent exacerbation on Sunday  Hypertension  Aortic valve replacement (on Warfarin managed by Dr. Bailey at [place])  Hyperlipidemia  IBS    PAST SURGICAL HISTORY   Bilateral hip replacements     FAMILY HISTORY   Non-contributory    SOCIAL HISTORY     Social History     Social History   • Marital status:      Spouse name: N/A   • Number of children: N/A   • Years of education: N/A     Occupational History   • Not on file.     Social History Main Topics   • Smoking status: Former Smoker     Types: Cigarettes   • Smokeless tobacco: Former User      Comment: quit 40 years ago   • Alcohol use 1.2 oz/week     2 Standard drinks or equivalent per week   • Drug use: No   • Sexual activity: Not on file     Other Topics Concern   • Not on file     Social History Narrative   • No narrative on file     tobacco: former smoker, EtOH: 2-4 drinks daily, drugs: denies    MEDICATIONS     Current Facility-Administered Medications:   •  acetaminophen (TYLENOL) tablet 650 mg, 650 mg, oral, q6h [facility],  Ericka Brannon PA, 650 mg at 08/30/18 1941  •  albuterol nebulizer solution 2.5 mg, 2.5 mg, nebulization, QID, Ericka Brannon PA, 2.5 mg at 08/30/18 2224  •  calcium gluconate 1,000 mg in sodium chloride 0.9 % 50 mL IVPB, 1 g, intravenous, PRN, Ericka Brannon PA  •  glucose chewable tablet 16-32 g of dextrose, 16-32 g of dextrose, oral, PRN **OR** dextrose 40 % oral gel 15-30 g of dextrose, 15-30 g of dextrose, oral, PRN **OR** glucagon (GLUCAGEN) injection 1 mg, 1 mg, intramuscular, PRN **OR** dextrose in water injection 12.5 g, 25 mL, intravenous, PRN, Ericka Brannon PA  •  fluticasone-vilanterol (BREO ELLIPTA) 100-25 mcg/dose powder for inhalation 1 puff, 1 puff, inhalation, Daily, Ericka Brannon PA  •  HYDROmorphone (DILAUDID) tablet 2 mg, 2 mg, oral, q4h PRN, Ericka Brannon PA, 2 mg at 08/31/18 0006  •  HYDROmorphone (DILAUDID) tablet 4 mg, 4 mg, oral, q4h PRN, Ericka Brannon PA  •  lidocaine (ASPERCREME) 4 % topical patch 1 patch, 1 patch, Topical, Daily, Ericka Brannon PA  •  loratadine (CLARITIN) tablet 10 mg, 10 mg, oral, Daily, Ericka Brannon PA  •  ondansetron (ZOFRAN) injection 4 mg, 4 mg, intravenous, q8h PRN, Ericka Brannon PA  •  phytonadione (vitamin K1) (AQUA-MEPHYTON) 3 mg in sodium chloride 0.9 % 50 mL IVPB, 3 mg, intravenous, Once, Ericka Brannon PA  •  polyethylene glycol (MIRALAX) 17 gram packet 17 g, 17 g, oral, Daily, Ericka Brannon PA  •  potassium chloride (KLOR-CON) tablet extended release 20 mEq, 20 mEq, oral, PRN **OR** potassium chloride (KLOR-CON) tablet extended release 40 mEq, 40 mEq, oral, PRN **OR** potassium chloride 20 mEq in 100 mL IVPB  (premix), 20 mEq, intravenous, PRN **OR** potassium chloride 40 mEq in sodium chloride 0.9 % 250 mL IVPB, 40 mEq, intravenous, PRN, Ericka Brannon, PA  •  rosuvastatin (CRESTOR) tablet 40 mg, 40 mg, oral, Daily, Ericka Brannon, PA  •  sennosides-docusate sodium (SENOKOT-S)  8.6-50 mg per tablet 1 tablet, 1 tablet, oral, BID, Ericka Brannon PA  •  sodium chloride 0.9 % bolus 1,000 mL, 1,000 mL, intravenous, Once, Ericka Brannon PA, Last Rate: 500 mL/hr at 08/30/18 2356, 1,000 mL at 08/30/18 2356  •  sodium chloride 0.9 % infusion, 5 mL/hr, intravenous, PRN, Ericka Brannon, PA  •  sodium chloride 0.9 % infusion, 5 mL/hr, intravenous, PRN, Ericka Brannon, PA  •  tiotropium (SPIRIVA) 18 mcg per inhalation capsule 1 capsule, 1 capsule, inhalation, Daily, Ericka Brannon PA  •  zolpidem (AMBIEN) tablet 5 mg, 5 mg, oral, Nightly PRN, Ericka Brannon PA  Tetanus:  Not indicated    ALLERGIES     Allergies   Allergen Reactions   • Codeine GI intolerance   • Penicillins Other (see comments)     Severe stomach pains.       PRIMARY CARE PHYSICIAN   Parker Rodriguez MD (Inactive)    DIAGNOSTIC DATA   LABS:  Recent Results (from the past 24 hour(s))   MRSA Screen, Nares Only    Collection Time: 08/30/18  8:05 PM   Result Value Ref Range    MRSA DNA, Nares Negative Negative   CBC    Collection Time: 08/30/18  8:16 PM   Result Value Ref Range    WBC 17.36 (H) 3.80 - 10.50 K/uL    RBC 4.00 3.93 - 5.22 M/uL    Hemoglobin 11.7 (L) 11.8 - 15.7 g/dL    Hematocrit 35.0 35.0 - 45.0 %    MCV 87.5 83.0 - 98.0 fL    MCH 29.3 28.0 - 33.2 pg    MCHC 33.4 32.2 - 35.5 g/dL    RDW 14.3 11.7 - 14.4 %    Platelets 219 150 - 369 K/uL    MPV 10.3 9.4 - 12.3 fL   Comprehensive metabolic panel    Collection Time: 08/30/18  8:16 PM   Result Value Ref Range    Sodium 136 136 - 144 mmol/L    Potassium 3.8 3.6 - 5.1 mmol/L    Chloride 104 98 - 109 mmol/L    CO2 24 22 - 32 mmol/L    BUN 16 8 - 20 mg/dL    Creatinine 0.9 0.6 - 1.1 mg/dL    Glucose 130 (H) 70 - 99 mg/dL    Calcium 8.5 (L) 8.9 - 10.3 mg/dL    AST (SGOT) 76 (H) 15 - 41 IU/L    ALT (SGPT) 55 (H) 11 - 54 IU/L    Alkaline Phosphatase 98 35 - 126 IU/L    Total Protein 5.2 (L) 6.0 - 8.2 g/dL    Albumin 2.8 (L) 3.4 - 5.0 g/dL    Bilirubin,  Total 1.4 (H) 0.3 - 1.2 mg/dL    eGFR 59.9 (L) >=60.0 mL/min/1.73m*2    Anion Gap 8 3 - 15 mEQ/L   Protime-INR    Collection Time: 08/30/18  8:16 PM   Result Value Ref Range    PT 50.1 (H) 12.2 - 14.5 Sec.    INR 5.3 <6.0 INR   APTT    Collection Time: 08/30/18  8:16 PM   Result Value Ref Range    PTT 36 (H) 23 - 35 Sec.   Lactate    Collection Time: 08/30/18  8:16 PM   Result Value Ref Range    Lactate, Art 1.5 0.4 - 1.6 mmol/L   Phosphorus    Collection Time: 08/30/18  8:16 PM   Result Value Ref Range    Phosphorus 3.5 2.4 - 4.7 mg/dL   Magnesium    Collection Time: 08/30/18  8:16 PM   Result Value Ref Range    Magnesium 2.2 1.8 - 2.5 mg/dL   Type and Screen [facility] Lab    Collection Time: 08/30/18  8:16 PM   Result Value Ref Range    Antibody Screen Negative     ABO A     Rh Factor Positive     History Check No type on file    Protime-INR    Collection Time: 08/30/18 11:22 PM   Result Value Ref Range    PT 49.2 (H) 12.2 - 14.5 Sec.    INR 5.2 <6.0 INR   UA Hold for UC (Macro)    Collection Time: 08/30/18 11:34 PM   Result Value Ref Range    Color, Urine Aicha (A) Yellow    Clarity, Urine Clear Clear    Specific Gravity, Urine 1.017 1.002 - 1.030    pH, Urine 7.0 4.5 - 8.0    Leukocyte Esterase Negative Negative    Nitrite, Urine Negative Negative    Protein, Urine +2 (A) Negative    Glucose, Urine Negative Negative mg/dL    Ketones, Urine Negative Negative mg/dL    Urobilinogen, Urine 1.0 <2.0 EU/dL EU/dL    Bilirubin, Urine Negative Negative mg/dL    Blood, Urine Negative Negative   UA Microscopic    Collection Time: 08/30/18 11:34 PM   Result Value Ref Range    RBC, Urine 0 TO 4 0 TO 4 /hpf    WBC, Urine 4 TO 10 (A) 0 TO 3 /hpf    Squamous Epithelial +1 (A) None Seen /hpf    Hyaline Cast 0 TO 2 (A) None Seen /lpf    Bacteria, Urine None Seen None Seen /uL       IMAGING:  No results found.   OSH: CTH - no acute intracranial hemorrhage or fracture   OSH: CT Chest - Left sided 2-7th rib fxs 4th and 5th rib fractures  "in 2 placed. Thickening left chest wall suggesting intramuscular hematoma. Small opacity lingula, small pulmonary contusion no excluded. T8 compression fracture, likely chronic. Colonic diverticulosis.     PHYSICAL EXAM   BP (!) 151/72   Pulse 81   Temp 36.3 °C (97.3 °F) (Temporal)   Resp 20   Ht 1.549 m (5' 1\")   Wt 65.2 kg (143 lb 11.8 oz)   SpO2 96%   BMI 27.16 kg/m²      NEURO: A&Ox3, CN II-XII grossly intact. No focal neuro deficit. GCS 15  HEENT: Face symmetric, atraumatic, pupils 3 mm round and reactive bilaterally, EOMI; EACs clear bilaterally. Nares are clear. Lips and oral mucosa pink, moist and intact. Trachea midline. Tongue midline.   SPINE: Cervical spine nontender. T/L/S spine nontender, no stepoffs/deformities.   CHEST: Symmetric expansion, Left sided chest wall tenderness, no crepitus.   LUNGS: Clear to auscultation bilaterally. No wheezes, rhonchi or crackles noted. No use of accessory muscles or respiratory distress.   HEART: S1/S2, RRR  ABDOMEN: Soft, nontender, nondistended. (+) bowel sounds  PELVIS: Stable, nontender.   : Genitalia unremarkable.   RECTAL: Deferred  EXTREMITIES: No gross deformities. 2+ radial/DP pulses. 5/5 , bicep, tricep, dorsi/plantarflexion; sensation intact to light touch.  SKIN: warm, dry        IMPRESSION/PLAN     Consultant  Name & Service Emergent  Urgent or   Routine Time Paged Name & Time Responded Name & Time Arrived                        *emergent requires <30 minutes response on site; urgent requires <12 hours response on site     83 y.o. y/o female s/p mechanical fall at home with left sided chest wall pain and multiple left sided rib fractures.   1. Left sided rib fractures - Pain control. Respiratory monitoring. IS 10x/hr while awake (inital 750). AM CXR.  2. Follow up CT abdomen/pelvis.   3. Elevated INR (5.3/5.2) - Patient takes warfarin for aortic valve replacement normally managed by Dr. Bailey at [place] with usual INR goal of 2.5 per " patient. She denies changing her medication dosing or diet. Only new mediation per patient was prednisone for recent COPD exacerbation. Plan to give Vitamin K 3mg IV and 2 units of FFP to bring INR closer to target range.   4. COPD exacerbation - close respiratory monitoring. Patient does not remember what dose of prednisone she was taking, awaiting confirmation of medication from patient's daughters to continue taper. Spiriva, Breo and albuterol reordered.   5. Leukocytosis - Patient afebrile, UA without concern for UTI. Patient reports taking prednisone this week. Will trend WBCs, leukocytosis may be reactive and related to recent prednisone use.   6. T8 compression fracture noted on outside hospital records likely chronic. Patient without tenderness to palpation, suspect this is a chronic finding.       AUTHOR:  ALINA Rocha  Trauma Service pager #[phone], [phone]  8/31/2018  1:12 AM

## 2018-08-31 NOTE — PROGRESS NOTES
Patient: Bonny Howe  Location: [facility] SICU SICU11  MRN: [id number]  Today's date: 8/31/2018     [place] w/ PT please seee there vital sign section. Patient left in chair w/ call bell in reach, chair alarm, RN notified.          Therapy Pain/Vitals     Row Name 08/31/18 1250             Pain/Comfort/Sleep    Presence of Pain complains of pain/discomfort      Preferred Pain Scale number (Numeric Rating Pain Scale)      Pain Body Location - Side Left      Pain Body Location - Orientation posterior      Pain Rating (0-10): Rest 7      Pain Rating (0-10): Activity 8            Prior Living Environment  Lives With: spouse, child(dilshad), adult (spouse, daughter, CHANTE)  Living Arrangements: house  Home Accessibility: stairs to enter home, stairs within home  Living Environment Comment: 1STE w/ bedroom and stall shower on 1st level Equipment Currently Used at Home: none (owns RW, gb's elevated toilet, WC, shower chair)       Prior Level of Function  Ambulation: independent  Transferring: independent  Toileting: independent  Bathing: independent  Dressing: independent  Eating: independent  Communication: understands/communicates without difficulty  Swallowing: swallows foods/liquids without difficulty  Equipment Currently Used at Home: none (owns RW, gb's elevated toilet, WC, shower chair)  Prior Functional Level Comment: Independent ADLS, IADLs, caregiver for            OT Evaluation - 08/31/18 1255        Session Details    Document Type initial evaluation    Mode of Treatment occupational therapy       Time Calculation    Start Time 1221    Stop Time 1245    Time Calculation (min) 24 min       General Information    Patient Profile Reviewed? yes    Pertinent History of Current Functional Problem mechanical fall down 3 steps, L 2-7th rib fractures, w/ 4-5 fxs in 2 segments   PMH: COPD, HTN, AVR       Orientation Log    City 3-->spontaneous/free recall    Kind of Place 3-->spontaneous/free recall     Name of Hospital 3-->spontaneous/free recall    Month 3-->spontaneous/free recall    Date 3-->spontaneous/free recall    Year 3-->spontaneous/free recall    Day of Week 3-->spontaneous/free recall    Clock Time 3-->spontaneous/free recall    Etiology/Event 3-->spontaneous/free recall    Pathology Deficits 3-->spontaneous/free recall    Total Score 30       Cognition/Psychosocial    Safety Awareness intact       Range of Motion (ROM)    General Range of Motion no range of motion deficits identified       Manual Muscle Testing (MMT)    General MMT Assessment no strength deficits identified       Bed Mobility    Bed Mobility supine to sit    Lynn, Roll Left supervision    Lynn, Roll Right supervision    Lynn, Supine to Sit minimum assist (75% patient effort);1 person assist    Assistive Device (Bed Mobility) bed rails       Bed to Chair Transfer    Lynn, Bed to Chair close supervision       Sit to Stand Transfer    Lynn, Sit to Stand Transfer close supervision       Stand to Sit Transfer    Lynn, Stand to Sit Transfer close supervision       Upper Body Dressing    Upper Body Dressing Tasks don;pajama/robe    Upper Body Dressing Position edge of bed sitting    Upper Body Dressing Lynn minimum assist (75% or more patient effort);1 person assist       Lower Body Dressing    Lower Body Dressing Tasks don;socks    Lower Body Dressing Position edge of bed sitting    Concerns (Lower Body Dressing) difficulty utilizing one-handed methods    Comment increased difficulty crossing LE, using one hand unilateral side,        Toileting    Toileting Position supine    Lynn, Toileting supervision    Comment bridging performed bladder hygiene       AM-PAC (TM) - ADL (Current Function)    Putting on and taking off regular lower body clothing? 3 - A Little    Bathing? 3 - A Little    Toileting? 3 - A Little    Putting on/taking off regular upper body clothing? 3 - A Little     How much help for taking care of personal grooming? 3 - A Little    Eating meals? 4 - None    AM-PAC (TM) ADL Score 19       OT Clinical Impression    Patient's Goals For Discharge return to all previous roles/activities    Plan For Care Reviewed: Occupational Therapy OT plan for care discussed with patient    Impairments Found (OT Eval) aerobic capacity/endurance;ergonomics and body mechanics;gait, locomotion, and balance    Rehab Potential/Prognosis: Occupational Therapy good, to achieve stated therapy goals    OT Frequency of Treatment 5-7 times per week    Anticipated Equipment Needs at Discharge reacher    Expected Discharge Disposition home with home health;home with assist    Daily Outcome Statement Pt presents w/ minimal ADL and functional mobility deficits, expect steady progress and patient to return home w/ homehealth and assist from family.                         Education provided this session. See the Patient Education summary report for full details.    OT Care Plan Goals      Most Recent Value   Bathing Goal   Date Goal Established: Bathing  08/31/18   Time to Achieve Goal: Bathing  5 - 7 days   Level of [place] Goal: Bathing  independent   Goal Outcome: Bathing  goal ongoing   Grooming Goal   Date Goal Established: Grooming  08/31/18   Time to Achieve Goal: Grooming  5 - 7 days   Level of Jewell Goal: Grooming  independent   Goal Outcome: Grooming  goal ongoing   Lower Body Dressing Goal   Date Goal Established: Lower Body Dressing  08/31/18   Time to Achieve Goal: Lower Body Dressing  5 - 7 days   Level of Jewell  independent   Goal Outcome: Lower Body Dressing  goal ongoing   Transfer Goal   Date Goal Established: Transfer Training  08/31/18   Time to Achieve Goal: Transfer Training  5 - 7 days   Goal Activity: Transfer Training  all transfers   Level of Jewell Goal: Transfer Training  independent   Goal Outcome: Transfer Training  goal ongoing   Upper Body Dressing Goal    Date Goal Established: Upper Body Dressing  08/31/18   Time to Achieve Goal: Upper Body Dressing  5 - 7 days   Level of [place]  independent   Goal Outcome: Upper Body Dressing  goal ongoing

## 2018-08-31 NOTE — PROGRESS NOTES
Patient: Bonny Howe  Location: [facility] SICU SICU11  MRN: [id number]  Today's date: 8/31/2018     Patient left seated upright in chair at bedside in NAD, VSS, call bell and personal items within reach. RN aware. Patient is supervision level for mobility, rec home with PT and family assist at discharge.           Therapy Pain/Vitals - 08/31/18 1250        Pain/Comfort/Sleep    Presence of Pain complains of pain/discomfort    Preferred Pain Scale number (Numeric Rating Pain Scale)    Pain Body Location - Side Left    Pain Body Location - Orientation posterior    Pain Rating (0-10): Rest 7    Pain Rating (0-10): Activity 8          Prior Living Environment  Lives With: spouse, child(dilshad), adult (spouse, daughter, CHANTE)  Living Arrangements: house  Home Accessibility: stairs to enter home, stairs within home  Living Environment Comment: 1STE w/ bedroom and stall shower on 1st level Equipment Currently Used at Home: walker, rolling       Prior Level of Function  Ambulation: independent  Transferring: independent  Toileting: independent  Bathing: independent  Dressing: independent  Eating: independent  Communication: understands/communicates without difficulty  Swallowing: swallows foods/liquids without difficulty  Equipment Currently Used at Home: walker, rolling  Prior Functional Level Comment: Independent ADLS, IADLs, caregiver for            PT Evaluation - 08/31/18 1222        Session Details    Document Type initial evaluation    Mode of Treatment physical therapy       Time Calculation    Start Time 1222    Stop Time 1245    Time Calculation (min) 23 min       General Information    Patient Profile Reviewed? yes    Onset of Illness/Injury or Date of Surgery 08/30/18    Referring Physician Dr. Burnett     Pertinent History of Current Functional Problem s/p mechanical fall down 3  steps, transfer from [facility]. L 2-7th rib fx, 4-5th fx in 2 segments    Existing  Precautions/Restrictions no known precautions/restrictions       Cognition/Psychosocial    Safety Awareness intact       Range of Motion (ROM)    General Range of Motion no range of motion deficits identified       Manual Muscle Testing (MMT)    General MMT Assessment no strength deficits identified       Bed Mobility    Bed Mobility supine to sit    Volin, Roll Left supervision    Volin, Roll Right supervision    Volin, Supine to Sit minimum assist (75% patient effort)    Assistive Device (Bed Mobility) bed rails       Bed to Chair Transfer    Volin, Bed to Chair close supervision    Assistive Device walker, front-wheeled       Chair to Bed Transfer    Volin, Chair to Bed close supervision    Assistive Device walker, front-wheeled       Gait Training    Volin, Gait close supervision    Assistive Device walker, front-wheeled    Distance in Feet 14 feet    Gait Pattern Utilized swing-through    Gait Deviations Identified decreased mckenzie;decreased step length   endurance limited due to pain        Stairs Training    Volin, Stairs not tested       AM-PAC (TM) - Mobility (Current Function)    Turning from your back to your side while in a flat bed without using bedrails? 3 - A Little    Moving from lying on your back to sitting on the side of a flat bed without using bedrails? 3 - A Little    Moving to and from a bed to a chair? 3 - A Little    Standing up from a chair using your arms? 3 - A Little    To walk in a hospital room? 3 - A Little    Climbing 3-5 steps with a railing? 3 - A Little    AM-PAC (TM) Mobility Score 18       PT Clinical Impression    Patient's Goals For Discharge return home    Plan For Care Reviewed: Physical Therapy patient feedback incorporated in PT plan for care    Impairments Found (PT Eval) aerobic capacity/endurance;gait, locomotion, and balance    Rehab Potential/Prognosis good, to achieve stated therapy goals    PT Frequency of Treatment  5 times per week    Problem List impaired balance;decreased strength   decreased endurance    Activity Limitations Related to Problem List functional mobility not performed adequately or safely for community activity    Anticipated Equipment Needs at Discharge none   patient has RW For home use     Expected Discharge Disposition home with home health;home with assist   patient states she will have 24/7 assist at home from family    Daily Outcome Statement 8/31/2018: patient is supervision level for mobility. rec home with PT at discharge. will have 24/7 assist from family                         Education provided this session. See the Patient Education summary report for full details.    PT Care Plan Goals      Most Recent Value   Bed Mobility Goal   Time to Achieve Goal: Bed Mobility  by discharge   Goal Activity: Bed Mobility  all bed mobility activities   Level of Pitcher Goal: Bed Mobility  supervision required   Goal Outcome: Bed Mobility  goal ongoing   Gait Goal   Time to Achieve Goal: Gait Training  by discharge   Level of Pitcher  independent   Assistive Device: Gait Training  crutches, axillary   Distance Goal: Gait Training (feet)  100 feet   Goal Outcome: Gait Training  goal ongoing   Transfer Goal   Date Goal Established: Transfer Training  08/31/18   Time to Achieve Goal: Transfer Training  by discharge   Goal Activity: Transfer Training  all transfers   Level of Pitcher Goal: Transfer Training  independent   Assistive Device: Transfer Training  walker, rolling   Goal Outcome: Transfer Training  goal ongoing

## 2018-08-31 NOTE — PLAN OF CARE
Problem: Patient Care Overview  Goal: Plan of Care Review  Outcome: Ongoing (interventions implemented as appropriate)   08/31/18 7743   Coping/Psychosocial   Plan Of Care Reviewed With patient   Plan of Care Review   Progress progress toward functional goals as expected   Outcome Summary patient is supervision level for mobility. rec home with PT at discharge. patient will have 24/7 assist from family        Problem: Fall Risk (Adult)  Goal: Absence of Falls  Outcome: Ongoing (interventions implemented as appropriate)      Problem: Acute Therapy Services Goal & Intervention Plan  Goal: Bed Mobility Goal  Outcome: Ongoing (interventions implemented as appropriate)    Goal: Gait Training Goal  Outcome: Ongoing (interventions implemented as appropriate)    Goal: Transfer Training Goal  Outcome: Ongoing (interventions implemented as appropriate)

## 2018-09-01 ENCOUNTER — APPOINTMENT (INPATIENT)
Dept: RADIOLOGY | Facility: HOSPITAL | Age: 83
DRG: 184 | End: 2018-09-01
Attending: NURSE PRACTITIONER
Payer: COMMERCIAL

## 2018-09-01 LAB
ANION GAP SERPL CALC-SCNC: 5 MEQ/L (ref 3–15)
APTT PPP: 26 SEC. (ref 23–35)
BACTERIA UR CULT: ABNORMAL
BACTERIA UR CULT: ABNORMAL
BACTERIA URNS QL MICRO: 1 /UL
BILIRUB UR QL STRIP.AUTO: 1 MG/DL
BUN SERPL-MCNC: 14 MG/DL (ref 8–20)
CALCIUM SERPL-MCNC: 8.3 MG/DL (ref 8.9–10.3)
CHLORIDE SERPL-SCNC: 103 MMOL/L (ref 98–109)
CLARITY UR REFRACT.AUTO: ABNORMAL
CO2 SERPL-SCNC: 27 MMOL/L (ref 22–32)
COLOR UR AUTO: ABNORMAL
CREAT SERPL-MCNC: 0.9 MG/DL (ref 0.6–1.1)
ERYTHROCYTE [DISTWIDTH] IN BLOOD BY AUTOMATED COUNT: 14.6 % (ref 11.7–14.4)
GFR SERPL CREATININE-BSD FRML MDRD: 59.9 ML/MIN/1.73M*2
GLUCOSE SERPL-MCNC: 108 MG/DL (ref 70–99)
GLUCOSE UR STRIP.AUTO-MCNC: NEGATIVE MG/DL
HCT VFR BLDCO AUTO: 33.8 % (ref 35–45)
HGB BLD-MCNC: 11.2 G/DL (ref 11.8–15.7)
HGB UR QL STRIP.AUTO: 1
HYALINE CASTS #/AREA URNS LPF: ABNORMAL /LPF
INR PPP: 1.2 INR
KETONES UR STRIP.AUTO-MCNC: ABNORMAL MG/DL
LEUKOCYTE ESTERASE UR QL STRIP.AUTO: 1
MAGNESIUM SERPL-MCNC: 2.2 MG/DL (ref 1.8–2.5)
MCH RBC QN AUTO: 29.2 PG (ref 28–33.2)
MCHC RBC AUTO-ENTMCNC: 33.1 G/DL (ref 32.2–35.5)
MCV RBC AUTO: 88 FL (ref 83–98)
MUCOUS THREADS URNS QL MICRO: 3 /LPF
NITRITE UR QL STRIP.AUTO: NEGATIVE
PDW BLD AUTO: 10.3 FL (ref 9.4–12.3)
PH UR STRIP.AUTO: 5.5 [PH]
PHOSPHATE SERPL-MCNC: 3.2 MG/DL (ref 2.4–4.7)
PLATELET # BLD AUTO: 172 K/UL (ref 150–369)
POTASSIUM SERPL-SCNC: 3.7 MMOL/L (ref 3.6–5.1)
PROT UR QL STRIP.AUTO: 3
PROTHROMBIN TIME: 14.9 SEC. (ref 12.2–14.5)
RBC # BLD AUTO: 3.84 M/UL (ref 3.93–5.22)
RBC #/AREA URNS HPF: ABNORMAL /HPF
SODIUM SERPL-SCNC: 135 MMOL/L (ref 136–144)
SP GR UR REFRACT.AUTO: >1.035
SQUAMOUS URNS QL MICRO: 2 /HPF
UROBILINOGEN UR STRIP-ACNC: 1 EU/DL
WBC # BLD AUTO: 11.22 K/UL (ref 3.8–10.5)
WBC #/AREA URNS HPF: ABNORMAL /HPF

## 2018-09-01 PROCEDURE — 85610 PROTHROMBIN TIME: CPT | Performed by: PHYSICIAN ASSISTANT

## 2018-09-01 PROCEDURE — 36415 COLL VENOUS BLD VENIPUNCTURE: CPT | Performed by: PHYSICIAN ASSISTANT

## 2018-09-01 PROCEDURE — 85730 THROMBOPLASTIN TIME PARTIAL: CPT | Performed by: PHYSICIAN ASSISTANT

## 2018-09-01 PROCEDURE — 85027 COMPLETE CBC AUTOMATED: CPT | Performed by: PHYSICIAN ASSISTANT

## 2018-09-01 PROCEDURE — 84100 ASSAY OF PHOSPHORUS: CPT | Performed by: PHYSICIAN ASSISTANT

## 2018-09-01 PROCEDURE — 71045 X-RAY EXAM CHEST 1 VIEW: CPT

## 2018-09-01 PROCEDURE — 63700000 HC SELF-ADMINISTRABLE DRUG: Performed by: PHYSICIAN ASSISTANT

## 2018-09-01 PROCEDURE — 20000000 HC ROOM AND CARE ICU

## 2018-09-01 PROCEDURE — 63700000 HC SELF-ADMINISTRABLE DRUG: Performed by: NURSE PRACTITIONER

## 2018-09-01 PROCEDURE — 97535 SELF CARE MNGMENT TRAINING: CPT | Mod: GO

## 2018-09-01 PROCEDURE — 94640 AIRWAY INHALATION TREATMENT: CPT

## 2018-09-01 PROCEDURE — 63600000 HC DRUGS/DETAIL CODE: Performed by: NURSE PRACTITIONER

## 2018-09-01 PROCEDURE — 81001 URINALYSIS AUTO W/SCOPE: CPT | Performed by: PHYSICIAN ASSISTANT

## 2018-09-01 PROCEDURE — 97116 GAIT TRAINING THERAPY: CPT | Mod: GP

## 2018-09-01 PROCEDURE — 83735 ASSAY OF MAGNESIUM: CPT | Performed by: PHYSICIAN ASSISTANT

## 2018-09-01 PROCEDURE — 80048 BASIC METABOLIC PNL TOTAL CA: CPT | Performed by: PHYSICIAN ASSISTANT

## 2018-09-01 PROCEDURE — 25000000 HC PHARMACY GENERAL: Performed by: PHYSICIAN ASSISTANT

## 2018-09-01 PROCEDURE — 87086 URINE CULTURE/COLONY COUNT: CPT | Performed by: NURSE PRACTITIONER

## 2018-09-01 RX ORDER — HEPARIN SODIUM 5000 [USP'U]/ML
5000 INJECTION, SOLUTION INTRAVENOUS; SUBCUTANEOUS EVERY 8 HOURS
Status: DISCONTINUED | OUTPATIENT
Start: 2018-09-01 | End: 2018-09-03

## 2018-09-01 RX ORDER — WARFARIN 7.5 MG/1
7.5 TABLET ORAL ONCE
Status: COMPLETED | OUTPATIENT
Start: 2018-09-01 | End: 2018-09-01

## 2018-09-01 RX ADMIN — DILTIAZEM HYDROCHLORIDE 360 MG: 180 CAPSULE, EXTENDED RELEASE ORAL at 09:51

## 2018-09-01 RX ADMIN — SENNOSIDES AND DOCUSATE SODIUM 1 TABLET: 8.6; 5 TABLET ORAL at 09:51

## 2018-09-01 RX ADMIN — ACETAMINOPHEN 650 MG: 325 TABLET ORAL at 04:21

## 2018-09-01 RX ADMIN — HYDROMORPHONE HYDROCHLORIDE 2 MG: 2 TABLET ORAL at 15:39

## 2018-09-01 RX ADMIN — ALBUTEROL SULFATE 2.5 MG: 2.5 SOLUTION RESPIRATORY (INHALATION) at 21:50

## 2018-09-01 RX ADMIN — HYDROMORPHONE HYDROCHLORIDE 2 MG: 2 TABLET ORAL at 01:53

## 2018-09-01 RX ADMIN — POLYETHYLENE GLYCOL 3350 17 G: 17 POWDER, FOR SOLUTION ORAL at 08:09

## 2018-09-01 RX ADMIN — HYDROMORPHONE HYDROCHLORIDE 4 MG: 4 TABLET ORAL at 23:37

## 2018-09-01 RX ADMIN — LORATADINE 10 MG: 10 TABLET ORAL at 09:52

## 2018-09-01 RX ADMIN — ACETAMINOPHEN 650 MG: 325 TABLET ORAL at 21:30

## 2018-09-01 RX ADMIN — TIOTROPIUM BROMIDE 1 CAPSULE: 18 CAPSULE ORAL; RESPIRATORY (INHALATION) at 09:00

## 2018-09-01 RX ADMIN — HEPARIN SODIUM 5000 UNITS: 5000 INJECTION INTRAVENOUS; SUBCUTANEOUS at 15:47

## 2018-09-01 RX ADMIN — LIDOCAINE 1 PATCH: 246 PATCH TOPICAL at 09:00

## 2018-09-01 RX ADMIN — HYDROMORPHONE HYDROCHLORIDE 2 MG: 2 TABLET ORAL at 11:18

## 2018-09-01 RX ADMIN — ROSUVASTATIN CALCIUM 40 MG: 40 TABLET, FILM COATED ORAL at 09:52

## 2018-09-01 RX ADMIN — ZOLPIDEM TARTRATE 5 MG: 5 TABLET, FILM COATED ORAL at 22:11

## 2018-09-01 RX ADMIN — ALBUTEROL SULFATE 2.5 MG: 2.5 SOLUTION RESPIRATORY (INHALATION) at 16:12

## 2018-09-01 RX ADMIN — WARFARIN SODIUM 7.5 MG: 7.5 TABLET ORAL at 19:50

## 2018-09-01 RX ADMIN — FLUTICASONE FUROATE AND VILANTEROL TRIFENATATE 1 PUFF: 100; 25 POWDER RESPIRATORY (INHALATION) at 09:01

## 2018-09-01 RX ADMIN — SENNOSIDES AND DOCUSATE SODIUM 1 TABLET: 8.6; 5 TABLET ORAL at 19:49

## 2018-09-01 RX ADMIN — ACETAMINOPHEN 650 MG: 325 TABLET ORAL at 09:54

## 2018-09-01 RX ADMIN — HYDROMORPHONE HYDROCHLORIDE 2 MG: 2 TABLET ORAL at 19:50

## 2018-09-01 RX ADMIN — HYDROMORPHONE HYDROCHLORIDE 2 MG: 2 TABLET ORAL at 06:02

## 2018-09-01 RX ADMIN — ALBUTEROL SULFATE 2.5 MG: 2.5 SOLUTION RESPIRATORY (INHALATION) at 09:02

## 2018-09-01 ASSESSMENT — COGNITIVE AND FUNCTIONAL STATUS - GENERAL
EATING MEALS: 4 - NONE
STANDING UP FROM CHAIR USING ARMS: 3 - A LITTLE
DRESSING REGULAR UPPER BODY CLOTHING: 3 - A LITTLE
DRESSING REGULAR LOWER BODY CLOTHING: 2 - A LOT
HELP NEEDED FOR PERSONAL GROOMING: 3 - A LITTLE
CLIMB 3 TO 5 STEPS WITH RAILING: 3 - A LITTLE
WALKING IN HOSPITAL ROOM: 3 - A LITTLE
TOILETING: 3 - A LITTLE
MOVING TO AND FROM BED TO CHAIR: 3 - A LITTLE
HELP NEEDED FOR BATHING: 2 - A LOT

## 2018-09-01 NOTE — PLAN OF CARE
Problem: Patient Care Overview  Goal: Plan of Care Review  Outcome: Ongoing (interventions implemented as appropriate)   09/01/18 1138   Coping/Psychosocial   Plan Of Care Reviewed With patient   Plan of Care Review   Progress progress toward functional goals as expected   Outcome Summary OT Treatment completed 9/1/18.        Problem: Acute Therapy Services Goal & Intervention Plan  Goal: Bathing Goal  Outcome: Ongoing (interventions implemented as appropriate)    Goal: Grooming Goal  Outcome: Ongoing (interventions implemented as appropriate)    Goal: Lower Body Dressing Goal  Outcome: Ongoing (interventions implemented as appropriate)    Goal: Transfer Training Goal  Outcome: Ongoing (interventions implemented as appropriate)    Goal: Upper Body Dressing Goal  Outcome: Ongoing (interventions implemented as appropriate)

## 2018-09-01 NOTE — PROGRESS NOTES
Patient: Bonny Howe  Location: [facility] SICU SICU11  MRN: [id number]  Today's date: 9/1/2018    Pt returned in chair with call bell in reach, alarm active. RN notified.     Vitals: 100 bpm, 153/72, 96% RA  Pain: reports mild pain at L ribs       Prior Living Environment  Lives With: spouse, child(dilshad), adult (spouse, daughter, CHANTE)  Living Arrangements: house  Home Accessibility: stairs to enter home, stairs within home  Living Environment Comment: 1STE w/ bedroom and stall shower on 1st level Equipment Currently Used at Home: walker, rolling       Prior Level of Function  Ambulation: independent  Transferring: independent  Toileting: independent  Bathing: independent  Dressing: independent  Eating: independent  Communication: understands/communicates without difficulty  Swallowing: swallows foods/liquids without difficulty  Equipment Currently Used at Home: walker, rolling  Prior Functional Level Comment: Independent ADLS, IADLs, caregiver for            OT Treatment Summary - 09/01/18 1129        Session Details    Document Type daily treatment    Mode of Treatment occupational therapy       Time Calculation    Start Time 0942    Stop Time 1011    Time Calculation (min) 29 min       General Information    Patient Profile Reviewed? yes    Existing Precautions/Restrictions no known precautions/restrictions       Orientation Log    Comment AAOx3        Cognition/Psychosocial    Safety Awareness intact   good insight into her current deficits and safety for d/c       Bed Mobility    Bed Mobility supine to sit    [place], Roll Left 1 person assist;verbal cues;minimum assist (75% patient effort)       Bed to Chair Transfer    [place], Bed to Chair verbal cues   CL Sup-Min A    Verbal Cues safety;hand placement       Toilet Transfer    [place], Toilet Transfer minimum assist (75% patient effort);1 person assist;verbal cues    Verbal Cues hand placement;technique       Lower Body  Dressing    Lower Body Dressing Tasks don;socks    Comment Pt attempted but was unable to complete, Req Max A for LB dressing to don socks       Toileting    Toileting Position unsupported sitting    [place], Toileting minimum assist (75% patient effort);adjust/manage clothing       OT Clinical Impression    Patient's Goals For Discharge return to all previous roles/activities    Plan For Care Reviewed: Occupational Therapy OT plan for care discussed with patient    Impairments Found (OT Eval) aerobic capacity/endurance    Rehab Potential/Prognosis: Occupational Therapy good, to achieve stated therapy goals    OT Frequency of Treatment 3-5 times per week    Expected Discharge Disposition skilled nursing facility    Daily Outcome Statement Pt is requiring Min A for transfers and up to Max A for ADLs. She lives at home with a  who has a caregiver due to Alzheimer's; PTA she was his .  Rec SNF.                    Education provided this session. See the Patient Education summary report for full details.         OT Care Plan Goals      Most Recent Value   Bathing Goal   Date Goal Established: Bathing  08/31/18   Time to Achieve Goal: Bathing  5 - 7 days   Level of [place] Goal: Bathing  independent   Goal Outcome: Bathing  goal ongoing   Bed Mobility Goal   Time to Achieve Goal: Bed Mobility  by discharge   Goal Activity: Bed Mobility  all bed mobility activities   Level of [place] Goal: Bed Mobility  supervision required   Goal Outcome: Bed Mobility  goal ongoing   Grooming Goal   Date Goal Established: Grooming  08/31/18   Time to Achieve Goal: Grooming  5 - 7 days   Level of [place] Goal: Grooming  independent   Goal Outcome: Grooming  goal ongoing   Lower Body Dressing Goal   Date Goal Established: Lower Body Dressing  08/31/18   Time to Achieve Goal: Lower Body Dressing  5 - 7 days   Level of [place]  independent   Goal Outcome: Lower Body Dressing  goal ongoing    Transfer Goal   Date Goal Established: Transfer Training  08/31/18   Time to Achieve Goal: Transfer Training  by discharge   Goal Activity: Transfer Training  all transfers   Level of [place] Goal: Transfer Training  independent   Assistive Device: Transfer Training  walker, rolling   Goal Outcome: Transfer Training  goal ongoing   Upper Body Dressing Goal   Date Goal Established: Upper Body Dressing  08/31/18   Time to Achieve Goal: Upper Body Dressing  5 - 7 days   Level of [place]  independent   Goal Outcome: Upper Body Dressing  goal ongoing

## 2018-09-01 NOTE — PROGRESS NOTES
Trauma    Pt feels better  AVSS  No distress  Breathing comfortably  Hgb=11.2  Dispo pending    Christopher Burnett MD

## 2018-09-01 NOTE — PROGRESS NOTES
TRAUMA SURGERY TERTIARY NOTE     PATIENT NAME:  Bonny Howe YOB: 1935    AGE:  83 y.o.  GENDER: female   MRN:  [id number]  PATIENT #: [id number]     PRIMARY CARE PHYSICIAN     Parker Rodriguez MD (Inactive)    SUBJECTIVE      Denies CP/palps/SOB. Denies N/V.     VITAL SIGNS     Patient Vitals for the past 4 hrs:   BP Temp Temp src Pulse Resp SpO2   09/01/18 1200 (!) 142/67 36.4 °C (97.5 °F) Temporal (!) 105 20 92 %   09/01/18 1100 135/62 - - (!) 104 18 (!) 89 %        INTAKE & OUTPUT       Intake/Output Summary (Last 24 hours) at 09/01/18 1305  Last data filed at 09/01/18 1000   Gross per 24 hour   Intake             17.5 ml   Output              350 ml   Net           -332.5 ml     I/O this shift:  In: -   Out: 150 [Urine:150]     MEDICATIONS     Infusions:              Scheduled:     acetaminophen 650 mg oral q6h SULTANA   albuterol 2.5 mg nebulization QID   dilTIAZem  mg oral Daily   fluticasone-vilanterol 1 puff inhalation Daily   lidocaine 1 patch Topical Daily   loratadine 10 mg oral Daily   polyethylene glycol 17 g oral Daily   rosuvastatin 40 mg oral Daily   sennosides-docusate sodium 1 tablet oral BID   tiotropium 1 capsule inhalation Daily   warfarin 7.5 mg oral Once       Antibiotics:      PRN:       calcium gluconate 1 g PRN   dextrose in water 25 mL PRN   HYDROmorphone 2 mg q4h PRN   HYDROmorphone 4 mg q4h PRN   LORazepam 1 mg q1h PRN   ondansetron 4 mg q8h PRN   potassium chloride 20 mEq PRN   Or     potassium chloride 40 mEq PRN   zolpidem 5 mg Nightly PRN        HOME:              •  albuterol, Take 2.5 mg by nebulization 4 (four) times a day.  •  diltiazem LA, Take 360 mg by mouth daily.  •  fexofenadine, Take 180 mg by mouth daily.  •  fluticasone-vilanterol, Inhale 1 puff daily. Rinse mouth with water after use to reduce aftertaste and incidence of candidiasis. Do not swallow.  •  polyethylene glycol, Take 17 g by mouth daily.  •  rosuvastatin, Take 40 mg by mouth  daily.  •  tiotropium, Place 1 capsule into inhaler and inhale daily.  •  warfarin, Take 2.5 mg by mouth once daily. 5mg every Monday, Wednesday, Friday; takes 2.5mg all other days.  •  zolpidem, Take 12.5 mg by mouth nightly as needed for sleep.    PHYSICAL EXAM     NEURO: GCS 15. AAOx3, EVELYN @ 2mm, EOMi, CN II-XII grossly intact. Non-focal on exam. Following all commands. Sensation intact.  R L MOTOR (0-5):   5 5 C4 (deltoid)   5 5 C5 (biceps)   5 5 C6 (wrist ext)   5 5 C7 (triceps)   5 5 C8 (finger flexion)   5 5 T1 (hand intrinsics)   5 5 L2 (hip flexors)   5 5 L3 (quads)   5 5 L4 (TA)   5 5 L5 (EHL)   5 5 S1 (gastrocs)     SPINE: C-Spine non-tender to palp. T/L/S spine non-tender to palp, no stepoffs/deformities.   CHEST: Symmetric expansion, + tender to palp L later CW, no crepitus  LUNGS: + rhonchi bilaterally. No use of accessory muscles or respiratory distress.   CV: RRR, S1/S2. NSR. +2 radial/DP/femoral pulses.   ABDOMEN: Soft, nontender, nondistended. (+) bowel sounds     : voiding clear yellow urine.    EXTREMITIES: No gross deformities. Non-tender to palp in all joints.  SKIN: warm, dry. Old ecchymosis to B/L UE generalized, pt states from blood sticks, nontender    NEW FINDINGS on Physical Exam: No    Lines, Drains, Airways, Wounds:     Peripheral IV 08/30/18 Left Antecubital (Active)   Number of days: 2       Peripheral IV 08/30/18 Right Forearm (Active)   Number of days: 2       INJURIES REVIEWED     Injuries Identified to Date:  1. Left 2-7th ribs fxs, with 4-5th fx in 2 segments.     DIAGNOSTIC DATA     LABS:  Recent Results (from the past 24 hour(s))   CBC    Collection Time: 09/01/18  5:54 AM   Result Value Ref Range    WBC 11.22 (H) 3.80 - 10.50 K/uL    RBC 3.84 (L) 3.93 - 5.22 M/uL    Hemoglobin 11.2 (L) 11.8 - 15.7 g/dL    Hematocrit 33.8 (L) 35.0 - 45.0 %    MCV 88.0 83.0 - 98.0 fL    MCH 29.2 28.0 - 33.2 pg    MCHC 33.1 32.2 - 35.5 g/dL    RDW 14.6 (H) 11.7 - 14.4 %    Platelets 172 150 -  369 K/uL    MPV 10.3 9.4 - 12.3 fL   Basic metabolic panel    Collection Time: 09/01/18  5:54 AM   Result Value Ref Range    Sodium 135 (L) 136 - 144 mmol/L    Potassium 3.7 3.6 - 5.1 mmol/L    Chloride 103 98 - 109 mmol/L    CO2 27 22 - 32 mmol/L    BUN 14 8 - 20 mg/dL    Creatinine 0.9 0.6 - 1.1 mg/dL    Glucose 108 (H) 70 - 99 mg/dL    Calcium 8.3 (L) 8.9 - 10.3 mg/dL    eGFR 59.9 (L) >=60.0 mL/min/1.73m*2    Anion Gap 5 3 - 15 mEQ/L   Magnesium    Collection Time: 09/01/18  5:54 AM   Result Value Ref Range    Magnesium 2.2 1.8 - 2.5 mg/dL   Phosphorus    Collection Time: 09/01/18  5:54 AM   Result Value Ref Range    Phosphorus 3.2 2.4 - 4.7 mg/dL   Protime-INR    Collection Time: 09/01/18  5:54 AM   Result Value Ref Range    PT 14.9 (H) 12.2 - 14.5 Sec.    INR 1.2 <6.0 INR   APTT    Collection Time: 09/01/18  5:54 AM   Result Value Ref Range    PTT 26 23 - 35 Sec.      Serum creatinine: 0.9 mg/dL 09/01/18 0554  Estimated creatinine clearance: 39.9 mL/min  Microbiology Results     Procedure Component Value Units Date/Time    Urine culture [[id number]]  (Abnormal) Collected:  08/30/18 2334    Specimen:  Urine from Urine, Clean Catch Updated:  09/01/18 0545     Urine Culture **Positive Culture** (A)      4 x 10^4 CFU/mL Mixed Gram Positive Organisms    MRSA Screen, Nares Only [[id number]]  (Normal) Collected:  08/30/18 2005    Specimen:  Nasal Swab from Nose Updated:  08/30/18 2145     MRSA DNA, Nares Negative          IMAGING:  Ct Abdomen Pelvis With Iv Contrast    Result Date: 8/31/2018  IMPRESSION: No acute posttraumatic abnormality is demonstrated in the abdomen or pelvis. There are basilar pleuroparenchymal opacities as described, left greater than right. There has been previous cholecystectomy with biliary ductal dilatation demonstrated as described. The uterus is questioned at the right pelvis with prominent low attenuating endometrium questioned, gynecologic correlation advised. See comment for delineation of  findings. Results were communicated to the clinical service at the time of the study by Dr. Batres, the radiologist on call.  Fax was received by Nae  at 2:15 AM.    X-ray Chest 1 View    Result Date: 9/1/2018  IMPRESSION: There is a small left pleural effusion and volume loss.  Right lung is clear. There is no right pleural effusion.  The osseous structures are stable including rib fractures..     X-ray Chest 1 View    Result Date: 8/31/2018  IMPRESSION: Mild left lower lobe consolidation and small left pleural effusion. Nondisplaced left rib fracture.          LABS AND FINAL IMPRESSIONS OF ALL IMAGING REVIEWED: Yes     INCIDENTAL FINDINGS: Discussed with patient    MEDICAL RECONCILIATION REVIEWED: Yes     HOME MEDICATIONS RESUMED AS APPROPRIATE: YES    C-SPINE CLEARANCE: clinically cleared on exam      PROBLEM LIST     Patient Active Problem List    Diagnosis Date Noted   • Rib fractures 08/30/2018       IMPRESSION/PLAN     83 y.o. female HD#2 s/p mech fall on 8/30/2018 and tx from [facility] who sustained the above injuries.    Plan:    1.  Left 2-7th ribs fxs, with 4-5th fx in 2 segments   - Pain Management with PO dilaudid and RTC Tylenol   - Aggressive pulm toilet, IS 10xs.hr while awake (pulling 750 today), cough and deep breathe, OOB and ambulate   - Bowel regimen with pain meds   - CXR this am small left pleural effusion and volume loss.  Will  cont to monitor    2. INR   - currenlty today INR 1.2.  Pt received 5mg PO Coumadin 8/31.  Will give 7.5mg today and recheck 9/2. Goal 2.5 - 3.5   - Spoke with Dr. Bailey, pt's Cardiologist in regard to supratherpeutic INR and pt has f/u in 1 week to be seen and repeat INR.  Pt was recently in [facility] with INR 4.6 so Dr. Bailey is concerned pt not taking appropriately    3. COPD   - Aggressive pulm toilet   - Goal O2 88-92%, pt states home O2 on RA is ~ 92%   - Cont home meds   - Set up appointment with pt's pulmonologist, pt has been  using her Spiriva inhaler as rescue, and was recently seen at OSH on 8/20 for COPD excerebration.   - Educate pt and daughter who pt lives with in regard correct way to take medication.     4. Diet: Regular    5. PT/OT, PMR      DVT PPX: SCDs & sq heparin TID    DISPOSITION:  [facility]    JEFFERY Arnold  Trauma Service pager #[phone], [phone]  9/1/2018  1:05 PM

## 2018-09-01 NOTE — PROGRESS NOTES
Patient: Bonny Howe  Location: [facility] SICU SICU11  MRN: [id number]  Today's date: 9/1/2018    client agreeable to snf feels she is unable to go home . Has 24 hour assist for her  while she is away.      Therapy Pain/Vitals     Row Name 09/01/18 1000 09/01/18 1012       Pain/Comfort/Sleep    Pain Body Location flank  --    Pain Rating (0-10): Rest 4  --       Vital Signs    Pulse (!)  104 (!)  114    SpO2 94 % 94 %    BP  -- (!)  169/74          Prior Living Environment  Lives With: spouse, child(dilshad), adult (spouse, daughter, CHANTE)  Living Arrangements: house  Home Accessibility: stairs to enter home, stairs within home  Living Environment Comment: 1STE w/ bedroom and stall shower on 1st level Equipment Currently Used at Home: walker, rolling       Prior Level of Function  Ambulation: independent  Transferring: independent  Toileting: independent  Bathing: independent  Dressing: independent  Eating: independent  Communication: understands/communicates without difficulty  Swallowing: swallows foods/liquids without difficulty  Equipment Currently Used at Home: walker, rolling  Prior Functional Level Comment: Independent ADLS, IADLs, caregiver for            PT Treatment Summary - 09/01/18 1000        Session Details    Document Type daily treatment    Mode of Treatment co-treatment;physical therapy    Patient/Family Observations received in bed        Time Calculation    Start Time 1000    Stop Time 1030    Time Calculation (min) 30 min       General Information    Patient Profile Reviewed? yes    Onset of Illness/Injury or Date of Surgery 08/30/18    Pertinent History of Current Functional Problem mechanical fall    Limitations/Impairments hearing       Orientation Log    Comment AAOX3       Cognition/Psychosocial    Safety Awareness intact       Range of Motion (ROM)    General Range of Motion other (see comments)   limited by pain B UE       Bed Mobility    Bed Mobility supine to  sit    [place], Roll Left 1 person assist;minimum assist (75% patient effort)    [place], Supine to Sit minimum assist (75% patient effort)    Assistive Device (Bed Mobility) head of bed elevated       Bed to Chair Transfer    [place], Bed to Chair verbal cues    Verbal Cues safety;hand placement    Assistive Device walker, front-wheeled       Sit to Stand Transfer    [place], Sit to Stand Transfer minimum assist (75% patient effort)       Stand to Sit Transfer    [place], Stand to Sit Transfer minimum assist (75% patient effort)       Toilet Transfer    [place], Toilet Transfer minimum assist (75% patient effort)       Gait Analysis/Training    Gait/Stairs Locomotion Gait Training (Group)       Gait Training    [place], Gait minimum assist (75% or more patient effort)    Assistive Device walker, david    Distance in Feet 10 feet    Gait Pattern Utilized step-through    Gait Deviations Identified ataxic    Comment amb x 2 mildly unsteady cues for posture and footplacement        AM-PAC (TM) - Mobility (Current Function)    Turning from your back to your side while in a flat bed without using bedrails? 3 - A Little    Moving from lying on your back to sitting on the side of a flat bed without using bedrails? 3 - A Little    Moving to and from a bed to a chair? 3 - A Little    Standing up from a chair using your arms? 3 - A Little    To walk in a hospital room? 3 - A Little    Climbing 3-5 steps with a railing? 3 - A Little    AM-PAC (TM) Mobility Score 18       PT Clinical Impression    Plan For Care Reviewed: Physical Therapy patient feedback incorporated in PT plan for care    Impairments Found (PT Eval) gait, locomotion, and balance    Rehab Potential/Prognosis good, to achieve stated therapy goals    PT Frequency of Treatment 5 times per week    Problem List impaired balance    Activity Limitations Related to Problem List functional mobility not performed adequately or safely for  household activity    Expected Discharge Disposition skilled nursing facility    Daily Outcome Statement requires assist for all mobility. caretaker for her   will benefit from snf                        Education provided this session. See the Patient Education summary report for full details.    PT Care Plan Goals      Most Recent Value   Bed Mobility Goal   Time to Achieve Goal: Bed Mobility  by discharge   Goal Activity: Bed Mobility  all bed mobility activities   Level of [place] Goal: Bed Mobility  supervision required   Goal Outcome: Bed Mobility  goal ongoing   Gait Goal   Time to Achieve Goal: Gait Training  by discharge   Level of [place]  independent   Assistive Device: Gait Training  crutches, axillary   Distance Goal: Gait Training (feet)  100 feet   Goal Outcome: Gait Training  goal ongoing   Transfer Goal   Date Goal Established: Transfer Training  08/31/18   Time to Achieve Goal: Transfer Training  by discharge   Goal Activity: Transfer Training  all transfers   Level of [place] Goal: Transfer Training  independent   Assistive Device: Transfer Training  walker, rolling   Goal Outcome: Transfer Training  goal ongoing

## 2018-09-01 NOTE — PLAN OF CARE
Problem: Patient Care Overview  Goal: Plan of Care Review  Outcome: Ongoing (interventions implemented as appropriate)   09/01/18 1600   Plan of Care Review   Outcome Summary unsteady gait today        Problem: Fall Risk (Adult)  Goal: Absence of Falls  Outcome: Ongoing (interventions implemented as appropriate)

## 2018-09-02 LAB
ANION GAP SERPL CALC-SCNC: 11 MEQ/L (ref 3–15)
APTT PPP: 33 SEC. (ref 23–35)
BACTERIA UR CULT: ABNORMAL
BACTERIA UR CULT: ABNORMAL
BUN SERPL-MCNC: 20 MG/DL (ref 8–20)
CALCIUM SERPL-MCNC: 8.5 MG/DL (ref 8.9–10.3)
CHLORIDE SERPL-SCNC: 101 MMOL/L (ref 98–109)
CO2 SERPL-SCNC: 25 MMOL/L (ref 22–32)
CREAT SERPL-MCNC: 0.9 MG/DL (ref 0.6–1.1)
ERYTHROCYTE [DISTWIDTH] IN BLOOD BY AUTOMATED COUNT: 14.6 % (ref 11.7–14.4)
GFR SERPL CREATININE-BSD FRML MDRD: 59.9 ML/MIN/1.73M*2
GLUCOSE SERPL-MCNC: 110 MG/DL (ref 70–99)
HCT VFR BLDCO AUTO: 34.1 % (ref 35–45)
HGB BLD-MCNC: 11.1 G/DL (ref 11.8–15.7)
INR PPP: 1.5 INR
ISBT CODE: 6200
ISBT CODE: 6200
MAGNESIUM SERPL-MCNC: 2.1 MG/DL (ref 1.8–2.5)
MCH RBC QN AUTO: 29 PG (ref 28–33.2)
MCHC RBC AUTO-ENTMCNC: 32.6 G/DL (ref 32.2–35.5)
MCV RBC AUTO: 89 FL (ref 83–98)
PDW BLD AUTO: 10.8 FL (ref 9.4–12.3)
PHOSPHATE SERPL-MCNC: 3.3 MG/DL (ref 2.4–4.7)
PLATELET # BLD AUTO: 175 K/UL (ref 150–369)
POTASSIUM SERPL-SCNC: 3.9 MMOL/L (ref 3.6–5.1)
PRODUCT CODE: NORMAL
PRODUCT CODE: NORMAL
PRODUCT STATUS: NORMAL
PRODUCT STATUS: NORMAL
PROTHROMBIN TIME: 18.4 SEC. (ref 12.2–14.5)
RBC # BLD AUTO: 3.83 M/UL (ref 3.93–5.22)
SODIUM SERPL-SCNC: 137 MMOL/L (ref 136–144)
SPECIMEN EXP DATE BLD: NORMAL
SPECIMEN EXP DATE BLD: NORMAL
UNIT ABO: NORMAL
UNIT ABO: NORMAL
UNIT ID: NORMAL
UNIT ID: NORMAL
UNIT RH: POSITIVE
UNIT RH: POSITIVE
WBC # BLD AUTO: 9.37 K/UL (ref 3.8–10.5)

## 2018-09-02 PROCEDURE — 84100 ASSAY OF PHOSPHORUS: CPT | Performed by: PHYSICIAN ASSISTANT

## 2018-09-02 PROCEDURE — 85730 THROMBOPLASTIN TIME PARTIAL: CPT | Performed by: PHYSICIAN ASSISTANT

## 2018-09-02 PROCEDURE — 85610 PROTHROMBIN TIME: CPT | Performed by: PHYSICIAN ASSISTANT

## 2018-09-02 PROCEDURE — 83735 ASSAY OF MAGNESIUM: CPT | Performed by: PHYSICIAN ASSISTANT

## 2018-09-02 PROCEDURE — 20000000 HC ROOM AND CARE ICU

## 2018-09-02 PROCEDURE — 63600000 HC DRUGS/DETAIL CODE: Performed by: NURSE PRACTITIONER

## 2018-09-02 PROCEDURE — 25000000 HC PHARMACY GENERAL: Performed by: PHYSICIAN ASSISTANT

## 2018-09-02 PROCEDURE — 36415 COLL VENOUS BLD VENIPUNCTURE: CPT | Performed by: PHYSICIAN ASSISTANT

## 2018-09-02 PROCEDURE — 63700000 HC SELF-ADMINISTRABLE DRUG: Performed by: NURSE PRACTITIONER

## 2018-09-02 PROCEDURE — 63700000 HC SELF-ADMINISTRABLE DRUG: Performed by: PHYSICIAN ASSISTANT

## 2018-09-02 PROCEDURE — 97116 GAIT TRAINING THERAPY: CPT | Mod: GP

## 2018-09-02 PROCEDURE — 94640 AIRWAY INHALATION TREATMENT: CPT

## 2018-09-02 PROCEDURE — 85027 COMPLETE CBC AUTOMATED: CPT | Performed by: PHYSICIAN ASSISTANT

## 2018-09-02 PROCEDURE — 80048 BASIC METABOLIC PNL TOTAL CA: CPT | Performed by: PHYSICIAN ASSISTANT

## 2018-09-02 RX ORDER — ACETAMINOPHEN 325 MG/1
975 TABLET ORAL EVERY 6 HOURS
Status: DISCONTINUED | OUTPATIENT
Start: 2018-09-02 | End: 2018-09-03 | Stop reason: HOSPADM

## 2018-09-02 RX ORDER — WARFARIN 7.5 MG/1
7.5 TABLET ORAL ONCE
Status: COMPLETED | OUTPATIENT
Start: 2018-09-02 | End: 2018-09-02

## 2018-09-02 RX ORDER — NITROFURANTOIN 25; 75 MG/1; MG/1
100 CAPSULE ORAL EVERY 12 HOURS
Status: DISCONTINUED | OUTPATIENT
Start: 2018-09-02 | End: 2018-09-03 | Stop reason: HOSPADM

## 2018-09-02 RX ADMIN — FLUTICASONE FUROATE AND VILANTEROL TRIFENATATE 1 PUFF: 100; 25 POWDER RESPIRATORY (INHALATION) at 08:28

## 2018-09-02 RX ADMIN — TIOTROPIUM BROMIDE 1 CAPSULE: 18 CAPSULE ORAL; RESPIRATORY (INHALATION) at 08:28

## 2018-09-02 RX ADMIN — NITROFURANTOIN (MONOHYDRATE/MACROCRYSTALS) 100 MG: 75; 25 CAPSULE ORAL at 21:12

## 2018-09-02 RX ADMIN — WARFARIN SODIUM 7.5 MG: 7.5 TABLET ORAL at 18:08

## 2018-09-02 RX ADMIN — HYDROMORPHONE HYDROCHLORIDE 2 MG: 2 TABLET ORAL at 15:02

## 2018-09-02 RX ADMIN — HEPARIN SODIUM 5000 UNITS: 5000 INJECTION INTRAVENOUS; SUBCUTANEOUS at 14:31

## 2018-09-02 RX ADMIN — HYDROMORPHONE HYDROCHLORIDE 4 MG: 4 TABLET ORAL at 20:29

## 2018-09-02 RX ADMIN — SENNOSIDES AND DOCUSATE SODIUM 1 TABLET: 8.6; 5 TABLET ORAL at 14:00

## 2018-09-02 RX ADMIN — DILTIAZEM HYDROCHLORIDE 360 MG: 180 CAPSULE, EXTENDED RELEASE ORAL at 09:42

## 2018-09-02 RX ADMIN — HYDROMORPHONE HYDROCHLORIDE 2 MG: 2 TABLET ORAL at 09:40

## 2018-09-02 RX ADMIN — ACETAMINOPHEN 650 MG: 325 TABLET ORAL at 09:41

## 2018-09-02 RX ADMIN — ACETAMINOPHEN 975 MG: 325 TABLET ORAL at 20:28

## 2018-09-02 RX ADMIN — POTASSIUM CHLORIDE 20 MEQ: 750 TABLET, FILM COATED, EXTENDED RELEASE ORAL at 14:33

## 2018-09-02 RX ADMIN — HEPARIN SODIUM 5000 UNITS: 5000 INJECTION INTRAVENOUS; SUBCUTANEOUS at 20:29

## 2018-09-02 RX ADMIN — POLYETHYLENE GLYCOL 3350 17 G: 17 POWDER, FOR SOLUTION ORAL at 07:19

## 2018-09-02 RX ADMIN — ALBUTEROL SULFATE 2.5 MG: 2.5 SOLUTION RESPIRATORY (INHALATION) at 08:29

## 2018-09-02 RX ADMIN — ALBUTEROL SULFATE 2.5 MG: 2.5 SOLUTION RESPIRATORY (INHALATION) at 16:01

## 2018-09-02 RX ADMIN — ROSUVASTATIN CALCIUM 40 MG: 40 TABLET, FILM COATED ORAL at 09:42

## 2018-09-02 RX ADMIN — LORATADINE 10 MG: 10 TABLET ORAL at 09:42

## 2018-09-02 RX ADMIN — ZOLPIDEM TARTRATE 5 MG: 5 TABLET, FILM COATED ORAL at 20:29

## 2018-09-02 RX ADMIN — ACETAMINOPHEN 975 MG: 325 TABLET ORAL at 14:55

## 2018-09-02 RX ADMIN — LIDOCAINE 1 PATCH: 246 PATCH TOPICAL at 09:39

## 2018-09-02 RX ADMIN — ALBUTEROL SULFATE 2.5 MG: 2.5 SOLUTION RESPIRATORY (INHALATION) at 21:32

## 2018-09-02 RX ADMIN — POTASSIUM CHLORIDE 20 MEQ: 750 TABLET, FILM COATED, EXTENDED RELEASE ORAL at 06:31

## 2018-09-02 ASSESSMENT — COGNITIVE AND FUNCTIONAL STATUS - GENERAL
STANDING UP FROM CHAIR USING ARMS: 3 - A LITTLE
CLIMB 3 TO 5 STEPS WITH RAILING: 3 - A LITTLE
MOVING TO AND FROM BED TO CHAIR: 3 - A LITTLE
WALKING IN HOSPITAL ROOM: 3 - A LITTLE

## 2018-09-02 NOTE — PROGRESS NOTES
Sw met with pt to discuss d/c planning. At this time, pt would like to go to SNF at time of d/c from [facility] Pt's choices in facilities are [facility] and [facility]. [facility] sent referrals and is awaiting responses. At this time, [facility] does not have a bed until Tuesday. [facility] faxed clinical information to [facility] to have the RN supervisor review. Pt will require insurance auth prior to d/c.  Sw to follow for emotional support and d/c planning. Maryanne Watkins, ALFREDO

## 2018-09-02 NOTE — PLAN OF CARE
Problem: Patient Care Overview  Goal: Plan of Care Review  Outcome: Ongoing (interventions implemented as appropriate)   09/01/18 2325   Coping/Psychosocial   Plan Of Care Reviewed With patient   Plan of Care Review   Progress progress toward functional goals as expected       Problem: Fall Risk (Adult)  Goal: Absence of Falls  Outcome: Ongoing (interventions implemented as appropriate)   09/01/18 2325   Fall Risk (Adult)   Absence of Falls making progress toward outcome

## 2018-09-02 NOTE — PROGRESS NOTES
Trauma    Pt feels better  AVSS  No distress  Breathing comfortably  Hgb=11.1  Positive UA - culture pending, on abx  Dispo pending    Christopher Burnett MD

## 2018-09-02 NOTE — PROGRESS NOTES
Patient: Bonny Howe  Location: [facility] SICU SICU11  MRN: [id number]  Today's date: 9/2/2018    cues for breathing tech throughout session . Left sitting in chair with reading material and call bell . Chair alarm on.      Therapy Pain/Vitals     Row Name 09/02/18 0900 09/02/18 0930       Pain/Comfort/Sleep    Presence of Pain complains of pain/discomfort  --    Preferred Pain Scale number (Numeric Rating Pain Scale)  --    Pain Body Location - Side Left  --    Pain Body Location - Orientation lateral  --    Pain Rating: Rest 6 - moderate-severe pain  --    Pain Rating: Activity 8 - severe pain  --       Vital Signs    Pulse 87 (!)  102    SpO2 94 % 92 %    /60 117/63          Prior Living Environment  Lives With: spouse, child(dilshad), adult (spouse, daughter, CHANTE)  Living Arrangements: house  Home Accessibility: stairs to enter home, stairs within home  Living Environment Comment: 1STE w/ bedroom and stall shower on 1st level Equipment Currently Used at Home: walker, rolling       Prior Level of Function  Ambulation: independent  Transferring: independent  Toileting: independent  Bathing: independent  Dressing: independent  Eating: independent  Communication: understands/communicates without difficulty  Swallowing: swallows foods/liquids without difficulty  Equipment Currently Used at Home: walker, rolling  Prior Functional Level Comment: Independent ADLS, IADLs, caregiver for            PT Treatment Summary - 09/02/18 0900        Session Details    Document Type daily treatment    Mode of Treatment individual therapy;physical therapy    Patient/Family Observations wears hearing aides        Time Calculation    Start Time 0900    Stop Time 0935    Time Calculation (min) 35 min       General Information    Patient Profile Reviewed? yes    Onset of Illness/Injury or Date of Surgery 08/30/18    Pertinent History of Current Functional Problem mechanical fall    Existing  Precautions/Restrictions fall       Coping Strategies    Trust Relationship/Rapport empathic listening provided;questions answered       Orientation Log    Comment alert and oriented x 3        Cognition/Psychosocial    Safety Awareness intact       Range of Motion (ROM)    General Range of Motion other (see comments)    Comment, General Range of Motion limited secondary to pain       Bed Mobility    Bed Mobility supine to sit to supine    [place], Roll Left 1 person assist    [place], Roll Right unable to assess    [place], Supine to Sit minimum assist (75% patient effort)    Assistive Device (Bed Mobility) head of bed elevated    Comment (Bed Mobility) unable to roll        Bed to Chair Transfer    [place], Bed to Chair minimum assist (75% patient effort)    Verbal Cues safety;hand placement    Assistive Device walker, front-wheeled       Sit to Stand Transfer    [place], Sit to Stand Transfer minimum assist (75% patient effort)       Stand to Sit Transfer    [place], Stand to Sit Transfer minimum assist (75% patient effort)       Toilet Transfer    [place], Toilet Transfer minimum assist (75% patient effort)    Verbal Cues hand placement       Gait Analysis/Training    Gait/Stairs Locomotion Gait Training (Group)       Gait Training    [place], Gait minimum assist (75% or more patient effort)    Assistive Device walker, front-wheeled    Distance in Feet 45 feet    Gait Pattern Utilized step-through    Gait Deviations Identified narrow base of support;scissoring;other (see comments)    Comment posterior lean        Stairs Training    [place], Stairs not tested       LE Seated Therapeutic Exercise    Exercise(s) Performed hip flexion;heel/toe raises;LAQ (long arc quad), knee extension    Sets/Reps Detail 10       AM-PAC (TM) - Mobility (Current Function)    Turning from your back to your side while in a flat bed without using bedrails? 3 - A Little    Moving from lying  on your back to sitting on the side of a flat bed without using bedrails? 3 - A Little    Moving to and from a bed to a chair? 3 - A Little    Standing up from a chair using your arms? 3 - A Little    To walk in a hospital room? 3 - A Little    Climbing 3-5 steps with a railing? 3 - A Little    AM-PAC (TM) Mobility Score 18       PT Weekly Outcome Summary    Plan for Continued Service After Discharge excellent snf canidate . improved vs was activtiity today                        Education provided this session. See the Patient Education summary report for full details.    PT Care Plan Goals      Most Recent Value   Bed Mobility Goal   Time to Achieve Goal: Bed Mobility  by discharge   Goal Activity: Bed Mobility  all bed mobility activities   Level of [place] Goal: Bed Mobility  supervision required   Goal Outcome: Bed Mobility  goal ongoing   Gait Goal   Time to Achieve Goal: Gait Training  by discharge   Level of [place]  independent   Assistive Device: Gait Training  crutches, axillary   Distance Goal: Gait Training (feet)  100 feet   Goal Outcome: Gait Training  goal ongoing   Transfer Goal   Date Goal Established: Transfer Training  08/31/18   Time to Achieve Goal: Transfer Training  by discharge   Goal Activity: Transfer Training  all transfers   Level of [place] Goal: Transfer Training  independent   Assistive Device: Transfer Training  walker, rolling   Goal Outcome: Transfer Training  goal ongoing

## 2018-09-02 NOTE — PLAN OF CARE
Problem: Patient Care Overview  Goal: Plan of Care Review  Outcome: Ongoing (interventions implemented as appropriate)   09/02/18 0953   Plan of Care Review   Outcome Summary DALE with all activities today but mobility improving daily. balance deficits indentified        Problem: Fall Risk (Adult)  Goal: Absence of Falls  Outcome: Ongoing (interventions implemented as appropriate)

## 2018-09-03 VITALS
RESPIRATION RATE: 18 BRPM | HEIGHT: 61 IN | DIASTOLIC BLOOD PRESSURE: 55 MMHG | OXYGEN SATURATION: 96 % | HEART RATE: 71 BPM | WEIGHT: 130.73 LBS | BODY MASS INDEX: 24.68 KG/M2 | TEMPERATURE: 97.9 F | SYSTOLIC BLOOD PRESSURE: 118 MMHG

## 2018-09-03 LAB
ANION GAP SERPL CALC-SCNC: 6 MEQ/L (ref 3–15)
APTT PPP: 45 SEC. (ref 23–35)
BUN SERPL-MCNC: 14 MG/DL (ref 8–20)
CALCIUM SERPL-MCNC: 8.4 MG/DL (ref 8.9–10.3)
CHLORIDE SERPL-SCNC: 103 MMOL/L (ref 98–109)
CO2 SERPL-SCNC: 28 MMOL/L (ref 22–32)
CREAT SERPL-MCNC: 1 MG/DL (ref 0.6–1.1)
ERYTHROCYTE [DISTWIDTH] IN BLOOD BY AUTOMATED COUNT: 14.6 % (ref 11.7–14.4)
GFR SERPL CREATININE-BSD FRML MDRD: 53.1 ML/MIN/1.73M*2
GLUCOSE SERPL-MCNC: 107 MG/DL (ref 70–99)
HCT VFR BLDCO AUTO: 32.7 % (ref 35–45)
HGB BLD-MCNC: 10.8 G/DL (ref 11.8–15.7)
INR PPP: 3.3 INR
MAGNESIUM SERPL-MCNC: 2.2 MG/DL (ref 1.8–2.5)
MCH RBC QN AUTO: 29.5 PG (ref 28–33.2)
MCHC RBC AUTO-ENTMCNC: 33 G/DL (ref 32.2–35.5)
MCV RBC AUTO: 89.3 FL (ref 83–98)
PDW BLD AUTO: 10.5 FL (ref 9.4–12.3)
PLATELET # BLD AUTO: 162 K/UL (ref 150–369)
POTASSIUM SERPL-SCNC: 4.4 MMOL/L (ref 3.6–5.1)
PROTHROMBIN TIME: 34.5 SEC. (ref 12.2–14.5)
RBC # BLD AUTO: 3.66 M/UL (ref 3.93–5.22)
SODIUM SERPL-SCNC: 137 MMOL/L (ref 136–144)
WBC # BLD AUTO: 7.15 K/UL (ref 3.8–10.5)

## 2018-09-03 PROCEDURE — 63700000 HC SELF-ADMINISTRABLE DRUG: Performed by: PHYSICIAN ASSISTANT

## 2018-09-03 PROCEDURE — 85027 COMPLETE CBC AUTOMATED: CPT | Performed by: NURSE PRACTITIONER

## 2018-09-03 PROCEDURE — 200200 PR NO CHARGE: Performed by: SURGERY

## 2018-09-03 PROCEDURE — 85730 THROMBOPLASTIN TIME PARTIAL: CPT | Performed by: NURSE PRACTITIONER

## 2018-09-03 PROCEDURE — 99238 HOSP IP/OBS DSCHRG MGMT 30/<: CPT | Performed by: SURGERY

## 2018-09-03 PROCEDURE — 36415 COLL VENOUS BLD VENIPUNCTURE: CPT | Performed by: NURSE PRACTITIONER

## 2018-09-03 PROCEDURE — 63700000 HC SELF-ADMINISTRABLE DRUG: Performed by: NURSE PRACTITIONER

## 2018-09-03 PROCEDURE — 83735 ASSAY OF MAGNESIUM: CPT | Performed by: NURSE PRACTITIONER

## 2018-09-03 PROCEDURE — 63600000 HC DRUGS/DETAIL CODE: Performed by: NURSE PRACTITIONER

## 2018-09-03 PROCEDURE — 25000000 HC PHARMACY GENERAL: Performed by: PHYSICIAN ASSISTANT

## 2018-09-03 PROCEDURE — 80048 BASIC METABOLIC PNL TOTAL CA: CPT | Performed by: NURSE PRACTITIONER

## 2018-09-03 PROCEDURE — 85610 PROTHROMBIN TIME: CPT | Performed by: NURSE PRACTITIONER

## 2018-09-03 RX ORDER — ACETAMINOPHEN 325 MG/1
975 TABLET ORAL EVERY 6 HOURS
Refills: 0
Start: 2018-09-03

## 2018-09-03 RX ORDER — WARFARIN 2.5 MG/1
2.5 TABLET ORAL
Status: DISCONTINUED | OUTPATIENT
Start: 2018-09-04 | End: 2018-09-03 | Stop reason: HOSPADM

## 2018-09-03 RX ORDER — LIDOCAINE 560 MG/1
1 PATCH PERCUTANEOUS; TOPICAL; TRANSDERMAL DAILY
Refills: 0
Start: 2018-09-04 | End: 2022-08-30

## 2018-09-03 RX ORDER — WARFARIN SODIUM 5 MG/1
5 TABLET ORAL 3 TIMES WEEKLY
Status: DISCONTINUED | OUTPATIENT
Start: 2018-09-05 | End: 2018-09-03

## 2018-09-03 RX ORDER — NITROFURANTOIN 25; 75 MG/1; MG/1
100 CAPSULE ORAL EVERY 12 HOURS
Qty: 4 CAPSULE | Refills: 0
Start: 2018-09-03 | End: 2018-09-05

## 2018-09-03 RX ORDER — AMOXICILLIN 250 MG
1 CAPSULE ORAL 2 TIMES DAILY
Refills: 0
Start: 2018-09-03 | End: 2022-08-30 | Stop reason: SDUPTHER

## 2018-09-03 RX ORDER — HYDROMORPHONE HYDROCHLORIDE 2 MG/1
2-4 TABLET ORAL EVERY 4 HOURS PRN
Qty: 30 TABLET | Refills: 0
Start: 2018-09-03 | End: 2018-09-03

## 2018-09-03 RX ORDER — HYDROMORPHONE HYDROCHLORIDE 2 MG/1
2 TABLET ORAL EVERY 4 HOURS PRN
Qty: 30 TABLET | Refills: 0 | Status: SHIPPED | OUTPATIENT
Start: 2018-09-03 | End: 2018-09-03

## 2018-09-03 RX ORDER — HYDROMORPHONE HYDROCHLORIDE 2 MG/1
2-4 TABLET ORAL EVERY 4 HOURS PRN
Qty: 30 TABLET | Refills: 0 | Status: SHIPPED | OUTPATIENT
Start: 2018-09-03 | End: 2018-09-03

## 2018-09-03 RX ORDER — WARFARIN SODIUM 5 MG/1
5 TABLET ORAL 3 TIMES WEEKLY
Status: DISCONTINUED | OUTPATIENT
Start: 2018-09-05 | End: 2018-09-03 | Stop reason: HOSPADM

## 2018-09-03 RX ORDER — HYDROMORPHONE HYDROCHLORIDE 2 MG/1
2 TABLET ORAL EVERY 4 HOURS PRN
Qty: 30 TABLET | Refills: 0 | Status: SHIPPED | OUTPATIENT
Start: 2018-09-03 | End: 2022-08-30 | Stop reason: SDUPTHER

## 2018-09-03 RX ADMIN — TIOTROPIUM BROMIDE 1 CAPSULE: 18 CAPSULE ORAL; RESPIRATORY (INHALATION) at 07:53

## 2018-09-03 RX ADMIN — ALBUTEROL SULFATE 2.5 MG: 2.5 SOLUTION RESPIRATORY (INHALATION) at 07:52

## 2018-09-03 RX ADMIN — ACETAMINOPHEN 975 MG: 325 TABLET ORAL at 04:34

## 2018-09-03 RX ADMIN — LIDOCAINE 1 PATCH: 246 PATCH TOPICAL at 07:49

## 2018-09-03 RX ADMIN — HEPARIN SODIUM 5000 UNITS: 5000 INJECTION INTRAVENOUS; SUBCUTANEOUS at 08:17

## 2018-09-03 RX ADMIN — FLUTICASONE FUROATE AND VILANTEROL TRIFENATATE 1 PUFF: 100; 25 POWDER RESPIRATORY (INHALATION) at 11:53

## 2018-09-03 RX ADMIN — ROSUVASTATIN CALCIUM 40 MG: 40 TABLET, FILM COATED ORAL at 07:53

## 2018-09-03 RX ADMIN — DILTIAZEM HYDROCHLORIDE 360 MG: 180 CAPSULE, EXTENDED RELEASE ORAL at 07:53

## 2018-09-03 RX ADMIN — NITROFURANTOIN (MONOHYDRATE/MACROCRYSTALS) 100 MG: 75; 25 CAPSULE ORAL at 07:53

## 2018-09-03 RX ADMIN — HYDROMORPHONE HYDROCHLORIDE 2 MG: 2 TABLET ORAL at 07:54

## 2018-09-03 RX ADMIN — HYDROMORPHONE HYDROCHLORIDE 4 MG: 4 TABLET ORAL at 11:52

## 2018-09-03 RX ADMIN — LORATADINE 10 MG: 10 TABLET ORAL at 07:53

## 2018-09-03 NOTE — PROGRESS NOTES
CTBS to discuss patient concerns. Patient declined abx prescribed earlier today for UTI, was concerned that they would interfere with her INR and was unclear why she needed them.  Reviewed lab findings of positive urine culture and correlated with patient's reports of urinary frequency. Assured patient that UTI should resolve with 3 day course of Macrobid and INR will not be affected.  Also reviewed rationale for SQ heparin shots to prevent DVT's while INR is subtherapeutic.  Patient stated she understood all we discussed and appreciated the information. She states she is agreeable to taking prescribed medications and has no further questions or concerns. Encourage patient to reach out with any additional issues as they arise and that they will be addressed in a timely manner.  Also confirmed with patient that she will transfer to [address] [place] [address] this evening as she no longer requires ICU care and apologized for late day transfer.  Patient again stated she understood the rationale and was comfortable with transferring this evening.

## 2018-09-03 NOTE — PROGRESS NOTES
Per JEFFERY Roberts, pt is stable for d/c today to SNF. RICK called and confirmed bed in [facility] today room 5511D. Rick called and received auth from Chandrika at [address] for 5 days, Skilled NRD 9/7, Auth is [id number], Call post Acute Line, [phone]. RICK provided auth information to Jeni in admissions. Pt is in agreement with d/c to [facility],as [facility] never returned SW call or confirmed receipt of clinicals, nor did [facility]. RICK LVM for son Wolfgang [phone] to notify of d/c per patient request.  SW will remain available if needed. Dayna Danielson [facility] [phone]

## 2018-09-03 NOTE — DISCHARGE INSTRUCTIONS
PLAN  - Follow up with your primary care provider in 1-2 weeks of discharge to make them aware of your recent hospitalization.  - HOLD coumadin dose for today (9/3).  Resume regular home dose tomorrow.  Please check INR daily to ensure stable levels (goal 2.5-3.5) and have results sent to Dr. Bailey's office at [facility]. Please continue regularly scheduled follow up with Dr. Bailey.   - Follow up with your pulmonologist (lung doctor) regarding your recent COPD exacerbation.   - Follow up with Trauma Clinic as needed.       Rib Fracture  A rib fracture is a break or crack in one of the bones of the ribs. The ribs are like a cage that goes around your upper chest. A broken or cracked rib is often painful, but most do not cause other problems. Most rib fractures heal on their own in 1-3 months.  Follow these instructions at home:  · Avoid activities that cause pain to the injured area. Protect your injured area.  · Slowly increase activity as told by your doctor.  · Take medicine as told by your doctor.  · Put ice on the injured area for the first 1-2 days after you have been treated or as told by your doctor.  ¨ Put ice in a plastic bag.  ¨ Place a towel between your skin and the bag.  ¨ Leave the ice on for 15-20 minutes at a time, every 2 hours while you are awake.  · Do deep breathing as told by your doctor. You may be told to:  ¨ Take deep breaths many times a day.  ¨ Cough many times a day while hugging a pillow.  ¨ Use a device (incentive spirometer) to perform deep breathing many times a day.  · Drink enough fluids to keep your pee (urine) clear or pale yellow.  · Do not wear a rib belt or binder. These do not allow you to breathe deeply.  Get help right away if:  · You have a fever.  · You have trouble breathing.  · You cannot stop coughing.  · You cough up thick or bloody spit (mucus).  · You feel sick to your stomach (nauseous), throw up (vomit), or have belly (abdominal) pain.  · Your pain gets  worse and medicine does not help.  This information is not intended to replace advice given to you by your health care provider. Make sure you discuss any questions you have with your health care provider.  Document Released: 09/26/2009 Document Revised: 05/25/2017 Document Reviewed: 02/19/2014  [employer] Interactive Patient Education © 2018 [employer]    Incentive Spirometer  An incentive spirometer is a tool that measures how well you are filling your lungs with each breath. This tool can help keep your lungs clear and active. Taking long, deep breaths may help reverse or decrease the chance of developing breathing (pulmonary) problems, especially infection, following:  · Surgery of the chest or abdomen.  · Surgery if you have a history of smoking or a lung problem.  · A long period of time when you are unable to move or be active.  If the spirometer includes an indicator to show your best effort, your health care provider or respiratory therapist will help you set a goal. Keep a log of your progress if directed by your health care provider.  What are the risks?  · Breathing too quickly may cause dizziness or cause you to pass out. Take your time so you do not get dizzy or lightheaded.  · If you are in pain, you may need to take or ask for pain medicine before doing incentive spirometry. It is harder to take a deep breath if you are having pain.  How to use your incentive spirometer  1. Sit on the edge of your bed if possible, or sit up as far as you can in bed or on a chair.  2. Hold the incentive spirometer in an upright position.  3. Breathe out normally.  4. Place the mouthpiece in your mouth and seal your lips tightly around it.  5. Breathe in slowly and as deeply as possible, raising the piston or the ball toward the top of the column.  6. Hold your breath for 3-5 seconds or for as long as possible. Allow the piston or ball to fall to the bottom of the column.  7. Remove the mouthpiece from your mouth and  breathe out normally.  8. The spirometer may include an indicator to show your best effort. Use the indicator as a goal to work toward during each repetition.  9. Rest for a few seconds and repeat this at least 10 times, every 1-2 hours when you are awake. Take your time and take a few normal breaths between deep breaths. Breathing too quickly may cause dizziness or cause you to pass out. Take your time so you do not get dizzy or lightheaded.  10. After each set of 10 deep breaths, practice coughing to be sure your lungs are clear. If you had a surgical cut (incision) made during surgery, support your incision when coughing by placing a pillow or rolled-up towel firmly against it.  Once you are able to get out of bed, walk around indoors and cough well. You may stop using the incentive spirometer when instructed by your health care provider.  Contact a health care provider if:  · You are having difficulty using the spirometer.  · You have trouble using the spirometer as often as instructed.  · Your pain medicine is not giving enough relief while using the spirometer.  · You have a fever.  · You develop shortness of breath.  Get help right away if:  · You develop a cough with bloody sputum.  · You develop worsening pain, redness, or discharge at or near the incision site.  This information is not intended to replace advice given to you by your health care provider. Make sure you discuss any questions you have with your health care provider.  Document Released: 04/29/2008 Document Revised: 09/11/2017 Document Reviewed: 07/27/2015  [employer] Interactive Patient Education © 2018 [employer]    Fall Prevention in the Home  Falls can cause injuries and can affect people from all age groups. There are many simple things that you can do to make your home safe and to help prevent falls.  What can I do on the outside of my home?  · Regularly repair the edges of walkways and driveways and fix any cracks.  · Remove high  doorway thresholds.  · Trim any shrubbery on the main path into your home.  · Use bright outdoor lighting.  · Clear walkways of debris and clutter, including tools and rocks.  · Regularly check that handrails are securely fastened and in good repair. Both sides of any steps should have handrails.  · Install guardrails along the edges of any raised decks or porches.  · Have leaves, snow, and ice cleared regularly.  · Use sand or salt on walkways during winter months.  · In the garage, clean up any spills right away, including grease or oil spills.  What can I do in the bathroom?  · Use night lights.  · Install grab bars by the toilet and in the tub and shower. Do not use towel bars as grab bars.  · Use non-skid mats or decals on the floor of the tub or shower.  · If you need to sit down while you are in the shower, use a plastic, non-slip stool.  · Keep the floor dry. Immediately clean up any water that spills on the floor.  · Remove soap buildup in the tub or shower on a regular basis.  · Attach bath mats securely with double-sided non-slip rug tape.  · Remove throw rugs and other tripping hazards from the floor.  What can I do in the bedroom?  · Use night lights.  · Make sure that a bedside light is easy to reach.  · Do not use oversized bedding that drapes onto the floor.  · Have a firm chair that has side arms to use for getting dressed.  · Remove throw rugs and other tripping hazards from the floor.  What can I do in the kitchen?  · Clean up any spills right away.  · Avoid walking on wet floors.  · Place frequently used items in easy-to-reach places.  · If you need to reach for something above you, use a sturdy step stool that has a grab bar.  · Keep electrical cables out of the way.  · Do not use floor polish or wax that makes floors slippery. If you have to use wax, make sure that it is non-skid floor wax.  · Remove throw rugs and other tripping hazards from the floor.  What can I do in the stairways?  · Do  not leave any items on the stairs.  · Make sure that there are handrails on both sides of the stairs. Fix handrails that are broken or loose. Make sure that handrails are as long as the stairways.  · Check any carpeting to make sure that it is firmly attached to the stairs. Fix any carpet that is loose or worn.  · Avoid having throw rugs at the top or bottom of stairways, or secure the rugs with carpet tape to prevent them from moving.  · Make sure that you have a light switch at the top of the stairs and the bottom of the stairs. If you do not have them, have them installed.  What are some other fall prevention tips?  · Wear closed-toe shoes that fit well and support your feet. Wear shoes that have rubber soles or low heels.  · When you use a stepladder, make sure that it is completely opened and that the sides are firmly locked. Have someone hold the ladder while you are using it. Do not climb a closed stepladder.  · Add color or contrast paint or tape to grab bars and handrails in your home. Place contrasting color strips on the first and last steps.  · Use mobility aids as needed, such as canes, walkers, scooters, and crutches.  · Turn on lights if it is dark. Replace any light bulbs that burn out.  · Set up furniture so that there are clear paths. Keep the furniture in the same spot.  · Fix any uneven floor surfaces.  · Choose a carpet design that does not hide the edge of steps of a stairway.  · Be aware of any and all pets.  · Review your medicines with your healthcare provider. Some medicines can cause dizziness or changes in blood pressure, which increase your risk of falling.  Talk with your health care provider about other ways that you can decrease your risk of falls. This may include working with a physical therapist or  to improve your strength, balance, and endurance.  This information is not intended to replace advice given to you by your health care provider. Make sure you discuss any  questions you have with your health care provider.  Document Released: 12/08/2003 Document Revised: 05/16/2017 Document Reviewed: 01/22/2016  [employer] Interactive Patient Education © 2017 [employer]      Senior Care Navigation Line:   [facility] offers a free Senior Care Navigation Line.  Our team serves as a resource for seniors, their families and caregivers to answer questions and provide guidance in making health care decisions.   We provide care in:  • Medical Needs  • Social and Psychosocial Concerns  • Functional Support (transportation, home assistance, etc.)  • Community Resources  • Care Navigation  • Offer information on a vast range of services at [facility] and the community  • Facilitate referrals to community agencies, physicians and more.    Please call [phone] for more information.    Please call the TRAUMA OFFICE as needed:   Dr. Roby Gomez   Department of Trauma  Phone: [phone]  [address] [facility].  [facility] ([facility] [address]  [place] [address]

## 2018-09-03 NOTE — PROGRESS NOTES
TRAUMA SURGERY TERTIARY NOTE     PATIENT NAME:  Bonny Howe YOB: 1935    AGE:  83 y.o.  GENDER: female   MRN:  [id number]  PATIENT #: [id number]     PRIMARY CARE PHYSICIAN     Parker Rodriguez MD (Inactive)    SUBJECTIVE     Awake alert and oriented today.  She has received 7.5 mg of Coumadin for the last 2 days.  Her INR today is 3.3.  We will hold her Coumadin dose tonight and begin her on her normal dose of 2.5 mg alternating with 5 mg once a day.  Patient will be discharged to skilled nursing facility today.  Remains on antibiotics for a UTI  VITAL SIGNS     No data found.       INTAKE & OUTPUT       Intake/Output Summary (Last 24 hours) at 09/03/18 1517  Last data filed at 09/03/18 1300   Gross per 24 hour   Intake             3880 ml   Output              600 ml   Net             3280 ml     I/O this shift:  In: 3400 [P.O.:3400]  Out: 400 [Urine:400]     MEDICATIONS     Infusions:              Scheduled:     acetaminophen 975 mg oral q6h SULTANA   albuterol 2.5 mg nebulization QID   dilTIAZem  mg oral Daily   fluticasone-vilanterol 1 puff inhalation Daily   lidocaine 1 patch Topical Daily   loratadine 10 mg oral Daily   nitrofurantoin (macrocrystal-monohydrate) 100 mg oral q12h SULTANA   polyethylene glycol 17 g oral Daily   rosuvastatin 40 mg oral Daily   sennosides-docusate sodium 1 tablet oral BID   tiotropium 1 capsule inhalation Daily   warfarin  oral Once   [START ON 9/4/2018] warfarin 2.5 mg oral Once per day on Sun Tue Thu Sat   [START ON 9/5/2018] warfarin 5 mg oral Once per day on Mon Wed Fri       Antibiotics:  Anti-infectives     Start     Dose/Rate Route Frequency Ordered Stop    09/03/18 0000  nitrofurantoin, macrocrystal-monohydrate, (MACROBID) 100 mg capsule      100 mg oral Every 12 hours 09/03/18 1258 09/05/18 2359    09/02/18 2100  nitrofurantoin (macrocrystal-monohydrate) (MACROBID) capsule 100 mg      100 mg oral Every 12 hours 09/02/18 1342 09/05/18 2059           PRN:       calcium gluconate 1 g PRN   dextrose in water 25 mL PRN   HYDROmorphone 2 mg q4h PRN   HYDROmorphone 4 mg q4h PRN   LORazepam 1 mg q1h PRN   ondansetron 4 mg q8h PRN   potassium chloride 20 mEq PRN   Or     potassium chloride 40 mEq PRN   zolpidem 5 mg Nightly PRN        HOME:              •  albuterol, Take 2.5 mg by nebulization 4 (four) times a day.  •  diltiazem LA, Take 360 mg by mouth daily.  •  fexofenadine, Take 180 mg by mouth daily.  •  fluticasone-vilanterol, Inhale 1 puff daily. Rinse mouth with water after use to reduce aftertaste and incidence of candidiasis. Do not swallow.  •  polyethylene glycol, Take 17 g by mouth daily.  •  rosuvastatin, Take 40 mg by mouth daily.  •  tiotropium, Place 1 capsule into inhaler and inhale daily.  •  warfarin, Take 2.5 mg by mouth once daily. 5mg every Monday, Wednesday, Friday; takes 2.5mg all other days.  •  zolpidem, Take 12.5 mg by mouth nightly as needed for sleep.    PHYSICAL EXAM     NEURO: GCS 15. AAOx3, EVELYN @ 2mm, EOMi, CN II-XII grossly intact. Non-focal on exam. Following all commands. Sensation intact.  SPINE: C-Spine non-tender to palp. T/L/S spine non-tender to palp, no stepoffs/deformities.   CHEST: Symmetric expansion, + tender to palp L later CW, no crepitus  LUNGS: + rhonchi bilaterally. No use of accessory muscles or respiratory distress.   CV: RRR,   ABDOMEN: Soft, nontender, nondistended. (+) bowel sounds     : voiding clear yellow urine.    EXTREMITIES: No gross deformities. Non-tender to palp in all joints.  SKIN: warm, dry. Old ecchymosis to B/L UE generalized, pt states from blood sticks, nontender          INJURIES REVIEWED     Injuries Identified to Date:  1. Left 2-7th ribs fxs, with 4-5th fx in 2 segments.     DIAGNOSTIC DATA     LABS:  Recent Results (from the past 24 hour(s))   Basic metabolic panel    Collection Time: 09/03/18  6:59 AM   Result Value Ref Range    Sodium 137 136 - 144 mmol/L    Potassium 4.4 3.6 - 5.1  mmol/L    Chloride 103 98 - 109 mmol/L    CO2 28 22 - 32 mmol/L    BUN 14 8 - 20 mg/dL    Creatinine 1.0 0.6 - 1.1 mg/dL    Glucose 107 (H) 70 - 99 mg/dL    Calcium 8.4 (L) 8.9 - 10.3 mg/dL    eGFR 53.1 (L) >=60.0 mL/min/1.73m*2    Anion Gap 6 3 - 15 mEQ/L   CBC    Collection Time: 09/03/18  6:59 AM   Result Value Ref Range    WBC 7.15 3.80 - 10.50 K/uL    RBC 3.66 (L) 3.93 - 5.22 M/uL    Hemoglobin 10.8 (L) 11.8 - 15.7 g/dL    Hematocrit 32.7 (L) 35.0 - 45.0 %    MCV 89.3 83.0 - 98.0 fL    MCH 29.5 28.0 - 33.2 pg    MCHC 33.0 32.2 - 35.5 g/dL    RDW 14.6 (H) 11.7 - 14.4 %    Platelets 162 150 - 369 K/uL    MPV 10.5 9.4 - 12.3 fL   Magnesium    Collection Time: 09/03/18  6:59 AM   Result Value Ref Range    Magnesium 2.2 1.8 - 2.5 mg/dL   Protime-INR    Collection Time: 09/03/18  6:59 AM   Result Value Ref Range    PT 34.5 (H) 12.2 - 14.5 Sec.    INR 3.3 <6.0 INR   PTT    Collection Time: 09/03/18  6:59 AM   Result Value Ref Range    PTT 45 (H) 23 - 35 Sec.      Serum creatinine: 1 mg/dL 09/03/18 0659  Estimated creatinine clearance: 35.9 mL/min  Microbiology Results     Procedure Component Value Units Date/Time    Urine culture [[id number]]  (Abnormal) Collected:  08/30/18 2334    Specimen:  Urine from Urine, Clean Catch Updated:  09/01/18 0545     Urine Culture **Positive Culture** (A)      4 x 10^4 CFU/mL Mixed Gram Positive Organisms    MRSA Screen, Nares Only [[id number]]  (Normal) Collected:  08/30/18 2005    Specimen:  Nasal Swab from Nose Updated:  08/30/18 2145     MRSA DNA, Nares Negative          IMAGING:  Ct Abdomen Pelvis With Iv Contrast    Result Date: 8/31/2018  IMPRESSION: No acute posttraumatic abnormality is demonstrated in the abdomen or pelvis. There are basilar pleuroparenchymal opacities as described, left greater than right. There has been previous cholecystectomy with biliary ductal dilatation demonstrated as described. The uterus is questioned at the right pelvis with prominent low attenuating  endometrium questioned, gynecologic correlation advised. See comment for delineation of findings. Results were communicated to the clinical service at the time of the study by Dr. Batres, the radiologist on call.  Fax was received by Nae  at 2:15 AM.    X-ray Chest 1 View    Result Date: 9/1/2018  IMPRESSION: There is a small left pleural effusion and volume loss.  Right lung is clear. There is no right pleural effusion.  The osseous structures are stable including rib fractures..     X-ray Chest 1 View    Result Date: 8/31/2018  IMPRESSION: Mild left lower lobe consolidation and small left pleural effusion. Nondisplaced left rib fracture.              INCIDENTAL FINDINGS: Discussed with patient    C-SPINE CLEARANCE: clinically cleared on exam      PROBLEM LIST     Patient Active Problem List    Diagnosis Date Noted   • Rib fractures 08/30/2018       IMPRESSION/PLAN     83 [facility]. [facility] who sustained the above injuries.    Plan:    1.  Left 2-7th ribs fxs, with 4-5th fx in 2 segments   - Pain Management with PO dilaudid and RTC Tylenol   - Aggressive pulm toilet, IS 10xs.hr while awake (pulling 750 today), cough and deep breathe, OOB and ambulate   - Bowel regimen with pain meds   - CXR this am small left pleural effusion and volume loss.  Will  cont to monitor    2. INR   - currenlty today INR 3.3.  He is to resume home regimen of Coumadin   3. COPD   - Aggressive pulm toilet current sat 95%   - Cont home meds   4. Diet: Regular    5. Disposition SNF      DVT PPX: SCDs & sq heparin TID    DISPOSITION:  SNF    Conrado Zamorano MD  Trauma Service pager #[phone], [phone]  9/3/2018  3:17 PM

## 2018-09-03 NOTE — NURSING NOTE
Pt discharged to [facility] with all belongings, discharge instructions & prescription sent with patient.

## 2018-09-03 NOTE — NURSING NOTE
"Made JEFFERY Gandhi aware that patient is refusing macrobid because she is \"unaware\" of UTI diagnoses. CRNP currently in room speaking with patient.  "

## 2018-09-03 NOTE — DISCHARGE SUMMARY
TRAUMA SURGERY DISCHARGE SUMMARY     PATIENT NAME:  Bonny Howe YOB: 1935    AGE:  83 y.o.  GENDER: female   MRN:  [id number]  PATIENT #: [id number]       Admission date: 8/30/2018    Discharge date: 9/3/2018    Admitting Physician: Roby Gomez MD     Discharge Physician: JEFFERY Kirkland Ma; Conrado Zamorano MD    Admitting Diagnoses:   Fall  Multiple left sided rib fractures  Urinary tract infection  Supratherapeutic INR  Mechanical aortic valve       Discharge Diagnoses:    Fall   Multiple left sided rib fractures  Urinary tract infection  Supratherapeutic INR  Mechanical aortic valve         Hospital Course: The patient is an 83 year old female who was transferred to [facility] from [facility] on 8/30/2018. On 08/30/2018 patient sustained a mechanical fall down 3 steps and developed subsequent left sided chest wall pain. She presented to [facility] for evaluation and was found to have fractures in left ribs 2-7 with fracture in 2 places in ribs 4-5. She was transferred to [facility].      Upon arrival to [facility] patient was found to have supra-therapeutic INR on 5.3. Patient takes Warfarin for mechanical aortic valve. Patient was treated with Vitamin K 3mg IV and 2 units of FFP to treat supra-therapeutic INR and return INR closer to therapeutic range. Warfarin resumed on 08/31/2018. She was started on a 3 day course of Macrobid for her UTI that was present on admission.     PMR was consulted and recommended SNF.  PT/OT worked with the patient and recommended SNF.     The patient was discharged on 9/3/2018 to skilled nursing facility in stable condition.     Discharge Medications:       Medication List      START taking these medications    acetaminophen 325 mg tablet  Commonly known as:  TYLENOL  Take 3 tablets (975 mg total) by mouth every 6 (six) hours.     HYDROmorphone 2 mg tablet  Commonly known as:  DILAUDID  Take 1 tablet (2 mg total) by mouth  every 4 (four) hours as needed (pain). Take 1 tablet for moderate pain on a scale of 4-7 and take 2 tablets for severe pain on a scale of 8-10.     lidocaine 4 % adhesive patch,medicated topical patch  Commonly known as:  ASPERCREME  Apply 1 patch topically daily. Apply to L chest wall.  Available over-the-counter.  May use ointment cream or patch.     nitrofurantoin (macrocrystal-monohydrate) 100 mg capsule  Commonly known as:  MACROBID  Take 1 capsule (100 mg total) by mouth every 12 (twelve) hours for 4 doses.     sennosides-docusate sodium 8.6-50 mg  Commonly known as:  SENOKOT-S  Take 1 tablet by mouth 2 (two) times a day. Continue while taking prescription pain medicine to prevent opioid-induced constipation.  Available over-the-counter.        CHANGE how you take these medications    * warfarin 2.5 mg tablet  Commonly known as:  COUMADIN  Take 2.5 mg by mouth once daily. 5mg every Monday, Wednesday, Friday; takes 2.5mg all other days.  What changed:  Another medication with the same name was added. Make sure you understand how and when to take each.     * warfarin no dose today  Commonly known as:  COUMADIN  NO dose tonight 9/3/2018  What changed:  You were already taking a medication with the same name, and this prescription was added. Make sure you understand how and when to take each.        * This list has 2 medication(s) that are the same as other medications prescribed for you. Read the directions carefully, and ask your doctor or other care provider to review them with you.            CONTINUE taking these medications    albuterol 2.5 mg /3 mL (0.083 %) nebulizer solution  Take 2.5 mg by nebulization 4 (four) times a day.     diltiazem  mg 24 hr tablet  Commonly known as:  CARDIZEM LA  Take 360 mg by mouth daily.     fexofenadine 180 mg tablet  Commonly known as:  ALLEGRA  Take 180 mg by mouth daily.     fluticasone-vilanterol 100-25 mcg/dose powder for inhalation  Commonly known as:  BREO  ELLIPTA  Inhale 1 puff daily. Rinse mouth with water after use to reduce aftertaste and incidence of candidiasis. Do not swallow.     polyethylene glycol 17 gram packet  Commonly known as:  MIRALAX  Take 17 g by mouth daily.     rosuvastatin 40 mg tablet  Commonly known as:  CRESTOR  Take 40 mg by mouth daily.     tiotropium 18 mcg per inhalation capsule  Commonly known as:  SPIRIVA  Place 1 capsule into inhaler and inhale daily.     zolpidem 5 mg tablet  Commonly known as:  AMBIEN  Take 12.5 mg by mouth nightly as needed for sleep.             Home Medications:      Follow-Up Appointments:    · f/u PCP in 1-2 weeks  · f/u trauma clinic as needed  · F/u pulmonologist for recent COPD exacerbation  · Cardiology (Dr. Bailey) for INR/Warfarin management. Please have INR checked after discharge with results sent to Dr. Bailey's office.    Disposition:   [facility]

## 2018-09-09 ENCOUNTER — APPOINTMENT (EMERGENCY)
Dept: RADIOLOGY | Facility: HOSPITAL | Age: 83
End: 2018-09-09
Payer: COMMERCIAL

## 2018-09-09 ENCOUNTER — HOSPITAL ENCOUNTER (EMERGENCY)
Facility: HOSPITAL | Age: 83
Discharge: HOME | End: 2018-09-09
Attending: EMERGENCY MEDICINE | Admitting: EMERGENCY MEDICINE
Payer: COMMERCIAL

## 2018-09-09 VITALS
OXYGEN SATURATION: 100 % | HEART RATE: 84 BPM | DIASTOLIC BLOOD PRESSURE: 86 MMHG | TEMPERATURE: 97.8 F | RESPIRATION RATE: 16 BRPM | SYSTOLIC BLOOD PRESSURE: 146 MMHG

## 2018-09-09 DIAGNOSIS — J44.9 CHRONIC OBSTRUCTIVE PULMONARY DISEASE, UNSPECIFIED COPD TYPE (CMS/HCC): Primary | ICD-10-CM

## 2018-09-09 DIAGNOSIS — R07.9 CHEST PAIN, UNSPECIFIED TYPE: ICD-10-CM

## 2018-09-09 LAB
ANION GAP SERPL CALC-SCNC: 10 MEQ/L (ref 3–15)
BASOPHILS # BLD: 0.01 K/UL (ref 0.01–0.1)
BASOPHILS NFR BLD: 0.2 %
BUN SERPL-MCNC: 12 MG/DL (ref 8–20)
CALCIUM SERPL-MCNC: 8.7 MG/DL (ref 8.9–10.3)
CHLORIDE SERPL-SCNC: 103 MEQ/L (ref 98–109)
CO2 SERPL-SCNC: 23 MEQ/L (ref 22–32)
CREAT SERPL-MCNC: 1 MG/DL (ref 0.6–1.1)
DIFFERENTIAL METHOD BLD: ABNORMAL
EOSINOPHIL # BLD: 0.14 K/UL (ref 0.04–0.36)
EOSINOPHIL NFR BLD: 2.4 %
ERYTHROCYTE [DISTWIDTH] IN BLOOD BY AUTOMATED COUNT: 14.1 % (ref 11.7–14.4)
GFR SERPL CREATININE-BSD FRML MDRD: 53.1 ML/MIN/1.73M*2
GLUCOSE SERPL-MCNC: 88 MG/DL (ref 70–99)
HCT VFR BLDCO AUTO: 33.9 % (ref 35–45)
HGB BLD-MCNC: 11.1 G/DL (ref 11.8–15.7)
IMM GRANULOCYTES # BLD AUTO: 0.03 K/UL (ref 0–0.08)
IMM GRANULOCYTES NFR BLD AUTO: 0.5 %
LYMPHOCYTES # BLD: 1.02 K/UL (ref 1.2–3.5)
LYMPHOCYTES NFR BLD: 17.3 %
MCH RBC QN AUTO: 29.2 PG (ref 28–33.2)
MCHC RBC AUTO-ENTMCNC: 32.7 G/DL (ref 32.2–35.5)
MCV RBC AUTO: 89.2 FL (ref 83–98)
MONOCYTES # BLD: 0.55 K/UL (ref 0.28–0.8)
MONOCYTES NFR BLD: 9.3 %
NEUTROPHILS # BLD: 4.16 K/UL (ref 1.7–7)
NEUTS SEG NFR BLD: 70.3 %
NRBC BLD-RTO: 0 %
PDW BLD AUTO: 10.4 FL (ref 9.4–12.3)
PLATELET # BLD AUTO: 226 K/UL (ref 150–369)
POTASSIUM SERPL-SCNC: 4.6 MEQ/L (ref 3.6–5.1)
RBC # BLD AUTO: 3.8 M/UL (ref 3.93–5.22)
SODIUM SERPL-SCNC: 136 MEQ/L (ref 136–144)
TROPONIN I SERPL-MCNC: <0.03 NG/ML
WBC # BLD AUTO: 5.91 K/UL (ref 3.8–10.5)

## 2018-09-09 PROCEDURE — 99283 EMERGENCY DEPT VISIT LOW MDM: CPT | Mod: 25

## 2018-09-09 PROCEDURE — 71046 X-RAY EXAM CHEST 2 VIEWS: CPT

## 2018-09-09 PROCEDURE — 85025 COMPLETE CBC W/AUTO DIFF WBC: CPT | Performed by: EMERGENCY MEDICINE

## 2018-09-09 PROCEDURE — 84484 ASSAY OF TROPONIN QUANT: CPT | Performed by: EMERGENCY MEDICINE

## 2018-09-09 PROCEDURE — 80048 BASIC METABOLIC PNL TOTAL CA: CPT | Performed by: EMERGENCY MEDICINE

## 2018-09-09 PROCEDURE — 93005 ELECTROCARDIOGRAM TRACING: CPT | Performed by: EMERGENCY MEDICINE

## 2018-09-09 PROCEDURE — 36415 COLL VENOUS BLD VENIPUNCTURE: CPT | Performed by: EMERGENCY MEDICINE

## 2018-09-09 PROCEDURE — 63700000 HC SELF-ADMINISTRABLE DRUG: Performed by: EMERGENCY MEDICINE

## 2018-09-09 PROCEDURE — 25000000 HC PHARMACY GENERAL: Performed by: EMERGENCY MEDICINE

## 2018-09-09 RX ORDER — ALBUTEROL SULFATE 90 UG/1
2 INHALANT RESPIRATORY (INHALATION) EVERY 4 HOURS PRN
Qty: 1 INHALER | Refills: 0 | Status: SHIPPED | OUTPATIENT
Start: 2018-09-09 | End: 2018-10-09

## 2018-09-09 RX ORDER — ACETAMINOPHEN 325 MG/1
650 TABLET ORAL ONCE
Status: COMPLETED | OUTPATIENT
Start: 2018-09-09 | End: 2018-09-09

## 2018-09-09 RX ORDER — ALBUTEROL SULFATE 0.83 MG/ML
5 SOLUTION RESPIRATORY (INHALATION) ONCE
Status: COMPLETED | OUTPATIENT
Start: 2018-09-09 | End: 2018-09-09

## 2018-09-09 RX ORDER — IPRATROPIUM BROMIDE AND ALBUTEROL SULFATE 2.5; .5 MG/3ML; MG/3ML
3 SOLUTION RESPIRATORY (INHALATION) ONCE
Status: COMPLETED | OUTPATIENT
Start: 2018-09-09 | End: 2018-09-09

## 2018-09-09 RX ORDER — PREDNISONE 50 MG/1
50 TABLET ORAL DAILY
Qty: 5 TABLET | Refills: 0 | Status: SHIPPED | OUTPATIENT
Start: 2018-09-09 | End: 2018-09-19

## 2018-09-09 RX ADMIN — ALBUTEROL SULFATE 5 MG: 2.5 SOLUTION RESPIRATORY (INHALATION) at 20:48

## 2018-09-09 RX ADMIN — PREDNISONE 50 MG: 20 TABLET ORAL at 20:46

## 2018-09-09 RX ADMIN — ACETAMINOPHEN 650 MG: 325 TABLET ORAL at 19:22

## 2018-09-09 RX ADMIN — IPRATROPIUM BROMIDE AND ALBUTEROL SULFATE 3 ML: .5; 3 SOLUTION RESPIRATORY (INHALATION) at 19:22

## 2018-09-10 LAB
ATRIAL RATE: 82
P AXIS: 57
PR INTERVAL: 170
QRS DURATION: 96
QT INTERVAL: 390
QTC CALCULATION(BAZETT): 455
R AXIS: 34
T WAVE AXIS: 79
VENTRICULAR RATE: 82

## 2018-09-10 NOTE — ED PROVIDER NOTES
HPI     Chief Complaint   Patient presents with   • Chest Pain       83-year-old female presents ED with chest pain.  Chest pain is sharp nonradiating.  Patient has COPD and took a breathing treatment earlier.  Patient also had recent admission for proper fractures.    Denies worsening dyspnea on exertion.         Denies diaphoresis/syncope             Patient History     History reviewed. No pertinent past medical history.    History reviewed. No pertinent surgical history.    History reviewed. No pertinent family history.    Social History   Substance Use Topics   • Smoking status: Former Smoker     Types: Cigarettes   • Smokeless tobacco: Former User      Comment: quit 40 years ago   • Alcohol use 1.2 oz/week     2 Standard drinks or equivalent per week       Systems Reviewed from Nursing Triage:          Review of Systems     Review of Systems   Constitutional: Negative for chills.   HENT: Negative for congestion.    Respiratory: Negative for chest tightness.    Cardiovascular: Negative for chest pain.   Genitourinary: Negative for difficulty urinating and dysuria.   Neurological: Negative for dizziness and headaches.   All other systems reviewed and are negative.       Physical Exam     ED Triage Vitals [09/09/18 1719]   Temp Heart Rate Resp BP SpO2   36.6 °C (97.8 °F) 81 18 (!) 165/75 100 %      Temp Source Heart Rate Source Patient Position BP Location FiO2 (%) (Set)   Oral Monitor Sitting Left upper arm --                     Patient Vitals for the past 24 hrs:   BP Temp Temp src Pulse Resp SpO2   09/09/18 2050 (!) 159/82 36.6 °C (97.8 °F) Oral 72 16 100 %   09/09/18 1928 (!) 150/75 - - 82 16 96 %   09/09/18 1719 (!) 165/75 36.6 °C (97.8 °F) Oral 81 18 100 %           Physical Exam   Constitutional: She appears well-developed.   HENT:   Head: Normocephalic and atraumatic.   Eyes: Conjunctivae are normal.   Neck: Normal range of motion.   Cardiovascular: Normal rate, regular rhythm and intact distal pulses.     Pulmonary/Chest: Effort normal and breath sounds normal.   Abdominal: Soft. She exhibits no distension and no mass. There is no tenderness.   Musculoskeletal: Normal range of motion. She exhibits no tenderness or deformity.   Neurological: She is alert. No cranial nerve deficit.   No motor deficit   Skin: Skin is warm and dry.   Psychiatric: She has a normal mood and affect. Her behavior is normal.   Nursing note and vitals reviewed.           Procedures    ED Course & MDM     Labs Reviewed   BASIC METABOLIC PANEL - Abnormal        Result Value    Sodium 136      Potassium 4.6      Chloride 103      CO2 23      BUN 12      Creatinine 1.0      Glucose 88      Calcium 8.7 (*)     eGFR 53.1 (*)     Anion Gap 10     CBC - Abnormal     WBC 5.91      RBC 3.80 (*)     Hemoglobin 11.1 (*)     Hematocrit 33.9 (*)     MCV 89.2      MCH 29.2      MCHC 32.7      RDW 14.1      Platelets 226      MPV 10.4     DIFF COUNT - Abnormal     Differential Type Auto      nRBC 0.0      Immature Granulocytes 0.5      Neutrophils 70.3      Lymphocytes 17.3      Monocytes 9.3      Eosinophils 2.4      Basophils 0.2      Immature Granulocytes, Absolute 0.03      Neutrophils, Absolute 4.16      Lymphocytes, Absolute 1.02 (*)     Monocytes, Absolute 0.55      Eosinophils, Absolute 0.14      Basophils, Absolute 0.01     TROPONIN I - Normal    Troponin I <0.03     CBC AND DIFFERENTIAL    Narrative:     The following orders were created for panel order CBC and differential.  Procedure                               Abnormality         Status                     ---------                               -----------         ------                     CBC[[id number]]                           Abnormal            Final result               Diff Count[[id number]]                    Abnormal            Final result                 Please view results for these tests on the individual orders.       ECG 12 lead    (Results Pending)   X-RAY CHEST 2 VIEWS     (Results Pending)          Ventricular rate 71    Atrial rate 71    RI Interval 152    QRS duration 116    QT Interval 426    QTC Calculation(Bezet) 462    P Axis 50    R Axis 39    T Wave Axis 79    Narrative     Normal sinus rhythm  Incomplete left bundle branch block  Nonspecific T wave abnormality  Abnormal ECG  No previous ECGs available  Confirmed by FADY FLORES MD ([id number]) on 8/31/2018 10:09:49 PM            MDM  Number of Diagnoses or Management Options  Chest pain, unspecified type:   Chronic obstructive pulmonary disease, unspecified COPD type (CMS/HCC) (HCC):   Diagnosis management comments: 83-year-old female presents ED with chest pain.  No nausea/vomiting/diarrhea felt much improved after albuterol and steroids.  Will discharge home on similar.  Given strict return instructions.               ED Course as of Sep 09 2128   Sun Sep 09, 2018   2124 Patient moderately improved with the treatments.  [MW]   2124 Chest pain improved.    We will discharge home.  [MW]      ED Course User Index  [MW] Wolfgang Nixon MD         Clinical Impressions as of Sep 09 2128   Chronic obstructive pulmonary disease, unspecified COPD type (CMS/HCC) (HCC)   Chest pain, unspecified type        Wolfgang Nixon MD  09/13/18 9189

## 2018-09-13 ASSESSMENT — ENCOUNTER SYMPTOMS
DIFFICULTY URINATING: 0
HEADACHES: 0
CHILLS: 0
DIZZINESS: 0
DYSURIA: 0
CHEST TIGHTNESS: 0

## 2021-02-21 ENCOUNTER — IMMUNIZATIONS (OUTPATIENT)
Dept: FAMILY MEDICINE CLINIC | Facility: HOSPITAL | Age: 86
End: 2021-02-21

## 2021-02-21 DIAGNOSIS — Z23 ENCOUNTER FOR IMMUNIZATION: Primary | ICD-10-CM

## 2021-02-21 PROCEDURE — 91300 SARS-COV-2 / COVID-19 MRNA VACCINE (PFIZER-BIONTECH) 30 MCG: CPT

## 2021-02-21 PROCEDURE — 0001A SARS-COV-2 / COVID-19 MRNA VACCINE (PFIZER-BIONTECH) 30 MCG: CPT

## 2021-03-14 ENCOUNTER — IMMUNIZATIONS (OUTPATIENT)
Dept: FAMILY MEDICINE CLINIC | Facility: HOSPITAL | Age: 86
End: 2021-03-14

## 2021-03-14 DIAGNOSIS — Z23 ENCOUNTER FOR IMMUNIZATION: Primary | ICD-10-CM

## 2021-03-14 PROCEDURE — 0002A SARS-COV-2 / COVID-19 MRNA VACCINE (PFIZER-BIONTECH) 30 MCG: CPT

## 2021-03-14 PROCEDURE — 91300 SARS-COV-2 / COVID-19 MRNA VACCINE (PFIZER-BIONTECH) 30 MCG: CPT

## 2022-01-13 PROCEDURE — 85610 PROTHROMBIN TIME: CPT | Performed by: INTERNAL MEDICINE

## 2022-01-13 PROCEDURE — 85027 COMPLETE CBC AUTOMATED: CPT | Performed by: INTERNAL MEDICINE

## 2022-01-13 PROCEDURE — 80048 BASIC METABOLIC PNL TOTAL CA: CPT | Performed by: INTERNAL MEDICINE

## 2022-01-14 PROCEDURE — 85610 PROTHROMBIN TIME: CPT | Performed by: INTERNAL MEDICINE

## 2022-01-16 PROCEDURE — 85610 PROTHROMBIN TIME: CPT | Performed by: INTERNAL MEDICINE

## 2022-01-17 PROCEDURE — 80048 BASIC METABOLIC PNL TOTAL CA: CPT | Performed by: INTERNAL MEDICINE

## 2022-01-17 PROCEDURE — 85610 PROTHROMBIN TIME: CPT | Performed by: INTERNAL MEDICINE

## 2022-01-17 PROCEDURE — 85027 COMPLETE CBC AUTOMATED: CPT | Performed by: INTERNAL MEDICINE

## 2022-01-19 PROCEDURE — 85610 PROTHROMBIN TIME: CPT | Performed by: INTERNAL MEDICINE

## 2022-01-20 PROCEDURE — 85610 PROTHROMBIN TIME: CPT | Performed by: INTERNAL MEDICINE

## 2022-08-30 ENCOUNTER — APPOINTMENT (EMERGENCY)
Dept: RADIOLOGY | Facility: HOSPITAL | Age: 87
DRG: 871 | End: 2022-08-30
Attending: EMERGENCY MEDICINE
Payer: COMMERCIAL

## 2022-08-30 ENCOUNTER — HOSPITAL ENCOUNTER (INPATIENT)
Facility: HOSPITAL | Age: 87
LOS: 3 days | Discharge: HOME HEALTH | DRG: 871 | End: 2022-09-02
Attending: EMERGENCY MEDICINE | Admitting: HOSPITALIST
Payer: COMMERCIAL

## 2022-08-30 DIAGNOSIS — R09.02 HYPOXIA: ICD-10-CM

## 2022-08-30 DIAGNOSIS — J18.9 PNEUMONIA OF RIGHT LOWER LOBE DUE TO INFECTIOUS ORGANISM: Primary | ICD-10-CM

## 2022-08-30 PROBLEM — Z95.2 H/O AORTIC VALVE REPLACEMENT: Status: ACTIVE | Noted: 2022-08-01

## 2022-08-30 PROBLEM — D64.9 ANEMIA: Status: ACTIVE | Noted: 2022-08-30

## 2022-08-30 PROBLEM — E55.9 VITAMIN D DEFICIENCY: Status: ACTIVE | Noted: 2022-08-01

## 2022-08-30 LAB
ALBUMIN SERPL-MCNC: 2.8 G/DL (ref 3.4–5)
ALP SERPL-CCNC: 96 IU/L (ref 35–126)
ALT SERPL-CCNC: 15 IU/L (ref 11–54)
ANION GAP SERPL CALC-SCNC: 10 MEQ/L (ref 3–15)
AST SERPL-CCNC: 23 IU/L (ref 15–41)
BASOPHILS # BLD: 0.02 K/UL (ref 0.01–0.1)
BASOPHILS NFR BLD: 0.2 %
BILIRUB SERPL-MCNC: 1 MG/DL (ref 0.3–1.2)
BNP SERPL-MCNC: 344 PG/ML
BUN SERPL-MCNC: 15 MG/DL (ref 8–20)
CALCIUM SERPL-MCNC: 9 MG/DL (ref 8.9–10.3)
CHLORIDE SERPL-SCNC: 92 MEQ/L (ref 98–109)
CO2 SERPL-SCNC: 34 MEQ/L (ref 22–32)
CREAT SERPL-MCNC: 1 MG/DL (ref 0.6–1.1)
DIFFERENTIAL METHOD BLD: ABNORMAL
EOSINOPHIL # BLD: 0.02 K/UL (ref 0.04–0.36)
EOSINOPHIL NFR BLD: 0.2 %
ERYTHROCYTE [DISTWIDTH] IN BLOOD BY AUTOMATED COUNT: 16.2 % (ref 11.7–14.4)
FLUAV RNA SPEC QL NAA+PROBE: NEGATIVE
FLUBV RNA SPEC QL NAA+PROBE: NEGATIVE
GFR SERPL CREATININE-BSD FRML MDRD: 52.6 ML/MIN/1.73M*2
GLUCOSE SERPL-MCNC: 112 MG/DL (ref 70–99)
HCT VFR BLDCO AUTO: 34.7 % (ref 35–45)
HGB BLD-MCNC: 10.8 G/DL (ref 11.8–15.7)
IMM GRANULOCYTES # BLD AUTO: 0.07 K/UL (ref 0–0.08)
IMM GRANULOCYTES NFR BLD AUTO: 0.6 %
INR PPP: 1.8
INR PPP: 2
LACTATE SERPL-SCNC: 1.4 MMOL/L (ref 0.4–2)
LYMPHOCYTES # BLD: 1.04 K/UL (ref 1.2–3.5)
LYMPHOCYTES NFR BLD: 8.3 %
MCH RBC QN AUTO: 25.2 PG (ref 28–33.2)
MCHC RBC AUTO-ENTMCNC: 31.1 G/DL (ref 32.2–35.5)
MCV RBC AUTO: 81.1 FL (ref 83–98)
MONOCYTES # BLD: 0.57 K/UL (ref 0.28–0.8)
MONOCYTES NFR BLD: 4.6 %
NEUTROPHILS # BLD: 10.77 K/UL (ref 1.7–7)
NEUTS SEG NFR BLD: 86.1 %
NRBC BLD-RTO: 0 %
PDW BLD AUTO: 10.5 FL (ref 9.4–12.3)
PLATELET # BLD AUTO: 265 K/UL (ref 150–369)
POTASSIUM SERPL-SCNC: 3.9 MEQ/L (ref 3.6–5.1)
PROT SERPL-MCNC: 7 G/DL (ref 6–8.2)
PROTHROMBIN TIME: 20.7 SEC (ref 12.2–14.5)
PROTHROMBIN TIME: 22.2 SEC (ref 12.2–14.5)
RBC # BLD AUTO: 4.28 M/UL (ref 3.93–5.22)
RSV RNA SPEC QL NAA+PROBE: NEGATIVE
SARS-COV-2 RNA RESP QL NAA+PROBE: NEGATIVE
SODIUM SERPL-SCNC: 136 MEQ/L (ref 136–144)
TROPONIN I SERPL HS-MCNC: 14 PG/ML
TROPONIN I SERPL HS-MCNC: 14 PG/ML
WBC # BLD AUTO: 12.49 K/UL (ref 3.8–10.5)

## 2022-08-30 PROCEDURE — 87637 SARSCOV2&INF A&B&RSV AMP PRB: CPT | Performed by: EMERGENCY MEDICINE

## 2022-08-30 PROCEDURE — 21400000 HC ROOM AND CARE CCU/INTERMEDIATE

## 2022-08-30 PROCEDURE — 36415 COLL VENOUS BLD VENIPUNCTURE: CPT | Performed by: EMERGENCY MEDICINE

## 2022-08-30 PROCEDURE — 63600000 HC DRUGS/DETAIL CODE: Performed by: EMERGENCY MEDICINE

## 2022-08-30 PROCEDURE — 80053 COMPREHEN METABOLIC PANEL: CPT | Performed by: EMERGENCY MEDICINE

## 2022-08-30 PROCEDURE — 99284 EMERGENCY DEPT VISIT MOD MDM: CPT | Mod: 25

## 2022-08-30 PROCEDURE — G1004 CDSM NDSC: HCPCS

## 2022-08-30 PROCEDURE — 96366 THER/PROPH/DIAG IV INF ADDON: CPT | Mod: 59

## 2022-08-30 PROCEDURE — 93005 ELECTROCARDIOGRAM TRACING: CPT

## 2022-08-30 PROCEDURE — 84484 ASSAY OF TROPONIN QUANT: CPT | Performed by: EMERGENCY MEDICINE

## 2022-08-30 PROCEDURE — 85610 PROTHROMBIN TIME: CPT | Performed by: EMERGENCY MEDICINE

## 2022-08-30 PROCEDURE — 83605 ASSAY OF LACTIC ACID: CPT | Performed by: EMERGENCY MEDICINE

## 2022-08-30 PROCEDURE — 85610 PROTHROMBIN TIME: CPT

## 2022-08-30 PROCEDURE — 99223 1ST HOSP IP/OBS HIGH 75: CPT | Performed by: HOSPITALIST

## 2022-08-30 PROCEDURE — 85025 COMPLETE CBC W/AUTO DIFF WBC: CPT | Performed by: EMERGENCY MEDICINE

## 2022-08-30 PROCEDURE — 25000000 HC PHARMACY GENERAL: Performed by: EMERGENCY MEDICINE

## 2022-08-30 PROCEDURE — 25800000 HC PHARMACY IV SOLUTIONS: Performed by: EMERGENCY MEDICINE

## 2022-08-30 PROCEDURE — 71045 X-RAY EXAM CHEST 1 VIEW: CPT

## 2022-08-30 PROCEDURE — 84484 ASSAY OF TROPONIN QUANT: CPT | Mod: 91 | Performed by: EMERGENCY MEDICINE

## 2022-08-30 PROCEDURE — 96365 THER/PROPH/DIAG IV INF INIT: CPT | Mod: 59

## 2022-08-30 PROCEDURE — 82040 ASSAY OF SERUM ALBUMIN: CPT | Performed by: EMERGENCY MEDICINE

## 2022-08-30 PROCEDURE — 63600105 HC IODINE BASED CONTRAST: Mod: JW | Performed by: EMERGENCY MEDICINE

## 2022-08-30 PROCEDURE — 93005 ELECTROCARDIOGRAM TRACING: CPT | Performed by: EMERGENCY MEDICINE

## 2022-08-30 PROCEDURE — 63700000 HC SELF-ADMINISTRABLE DRUG

## 2022-08-30 PROCEDURE — 87040 BLOOD CULTURE FOR BACTERIA: CPT | Performed by: EMERGENCY MEDICINE

## 2022-08-30 PROCEDURE — 83880 ASSAY OF NATRIURETIC PEPTIDE: CPT | Performed by: EMERGENCY MEDICINE

## 2022-08-30 RX ORDER — SENNOSIDES 8.6 MG/1
1 TABLET ORAL DAILY
Status: DISCONTINUED | OUTPATIENT
Start: 2022-08-30 | End: 2022-08-30

## 2022-08-30 RX ORDER — ROSUVASTATIN CALCIUM 10 MG/1
10 TABLET, COATED ORAL NIGHTLY
Status: DISCONTINUED | OUTPATIENT
Start: 2022-08-30 | End: 2022-09-02 | Stop reason: HOSPADM

## 2022-08-30 RX ORDER — DILTIAZEM HYDROCHLORIDE 120 MG/1
120 CAPSULE, COATED, EXTENDED RELEASE ORAL 3 TIMES DAILY
Status: DISCONTINUED | OUTPATIENT
Start: 2022-08-30 | End: 2022-09-02 | Stop reason: HOSPADM

## 2022-08-30 RX ORDER — PREDNISONE 5 MG/1
5 TABLET ORAL DAILY
Status: DISCONTINUED | OUTPATIENT
Start: 2022-08-30 | End: 2022-09-02 | Stop reason: HOSPADM

## 2022-08-30 RX ORDER — POLYETHYLENE GLYCOL 3350 17 G/17G
17 POWDER, FOR SOLUTION ORAL DAILY
Status: DISCONTINUED | OUTPATIENT
Start: 2022-08-31 | End: 2022-09-01

## 2022-08-30 RX ORDER — ZOLPIDEM TARTRATE 5 MG/1
5 TABLET ORAL NIGHTLY
Status: DISCONTINUED | OUTPATIENT
Start: 2022-08-30 | End: 2022-09-02 | Stop reason: HOSPADM

## 2022-08-30 RX ORDER — IPRATROPIUM BROMIDE AND ALBUTEROL SULFATE 2.5; .5 MG/3ML; MG/3ML
3 SOLUTION RESPIRATORY (INHALATION) EVERY 6 HOURS PRN
Status: DISCONTINUED | OUTPATIENT
Start: 2022-08-30 | End: 2022-08-31

## 2022-08-30 RX ORDER — ZOLPIDEM TARTRATE 10 MG/1
10 TABLET ORAL NIGHTLY PRN
COMMUNITY
End: 2022-09-02 | Stop reason: HOSPADM

## 2022-08-30 RX ORDER — SENNOSIDES 8.6 MG/1
1 TABLET ORAL NIGHTLY
Status: DISCONTINUED | OUTPATIENT
Start: 2022-08-30 | End: 2022-09-02 | Stop reason: HOSPADM

## 2022-08-30 RX ORDER — IPRATROPIUM BROMIDE AND ALBUTEROL SULFATE 2.5; .5 MG/3ML; MG/3ML
3 SOLUTION RESPIRATORY (INHALATION) EVERY 8 HOURS
Status: DISCONTINUED | OUTPATIENT
Start: 2022-08-31 | End: 2022-08-30

## 2022-08-30 RX ORDER — HYDROMORPHONE HYDROCHLORIDE 2 MG/1
2-4 TABLET ORAL EVERY 4 HOURS PRN
COMMUNITY

## 2022-08-30 RX ORDER — IPRATROPIUM BROMIDE 0.5 MG/2.5ML
500 SOLUTION RESPIRATORY (INHALATION) 4 TIMES DAILY PRN
COMMUNITY

## 2022-08-30 RX ORDER — IPRATROPIUM BROMIDE AND ALBUTEROL SULFATE 2.5; .5 MG/3ML; MG/3ML
3 SOLUTION RESPIRATORY (INHALATION) ONCE
Status: COMPLETED | OUTPATIENT
Start: 2022-08-30 | End: 2022-08-30

## 2022-08-30 RX ORDER — LORAZEPAM 0.5 MG/1
0.5 TABLET ORAL EVERY 6 HOURS PRN
COMMUNITY

## 2022-08-30 RX ORDER — SENNOSIDES 8.6 MG/1
1 TABLET ORAL DAILY
COMMUNITY

## 2022-08-30 RX ORDER — TORSEMIDE 5 MG/1
10 TABLET ORAL 2 TIMES DAILY
COMMUNITY
End: 2022-09-02 | Stop reason: HOSPADM

## 2022-08-30 RX ORDER — PREDNISONE 5 MG/1
5 TABLET ORAL DAILY
COMMUNITY
End: 2022-09-02 | Stop reason: HOSPADM

## 2022-08-30 RX ORDER — TORSEMIDE 10 MG/1
10 TABLET ORAL 2 TIMES DAILY
Status: DISCONTINUED | OUTPATIENT
Start: 2022-08-31 | End: 2022-09-02 | Stop reason: HOSPADM

## 2022-08-30 RX ORDER — ACETAMINOPHEN 325 MG/1
975 TABLET ORAL EVERY 6 HOURS
Status: DISCONTINUED | OUTPATIENT
Start: 2022-08-30 | End: 2022-09-02 | Stop reason: HOSPADM

## 2022-08-30 RX ORDER — HYDROMORPHONE HYDROCHLORIDE 2 MG/1
2-4 TABLET ORAL EVERY 4 HOURS PRN
Status: DISCONTINUED | OUTPATIENT
Start: 2022-08-30 | End: 2022-09-02 | Stop reason: HOSPADM

## 2022-08-30 RX ORDER — WARFARIN 2.5 MG/1
2.5 TABLET ORAL ONCE
Status: COMPLETED | OUTPATIENT
Start: 2022-08-30 | End: 2022-08-30

## 2022-08-30 RX ORDER — ALBUTEROL SULFATE 90 UG/1
2 INHALANT RESPIRATORY (INHALATION) EVERY 6 HOURS PRN
COMMUNITY

## 2022-08-30 RX ORDER — LORAZEPAM 0.5 MG/1
0.5 TABLET ORAL EVERY 6 HOURS PRN
Status: DISCONTINUED | OUTPATIENT
Start: 2022-08-30 | End: 2022-08-31

## 2022-08-30 RX ORDER — MECLIZINE HCL 12.5 MG 12.5 MG/1
12.5 TABLET ORAL 3 TIMES DAILY PRN
COMMUNITY

## 2022-08-30 RX ORDER — ALBUTEROL SULFATE 90 UG/1
2 INHALANT RESPIRATORY (INHALATION) EVERY 6 HOURS PRN
Status: DISCONTINUED | OUTPATIENT
Start: 2022-08-30 | End: 2022-09-02 | Stop reason: HOSPADM

## 2022-08-30 RX ADMIN — AZITHROMYCIN MONOHYDRATE 500 MG: 500 INJECTION, POWDER, LYOPHILIZED, FOR SOLUTION INTRAVENOUS at 16:47

## 2022-08-30 RX ADMIN — IPRATROPIUM BROMIDE AND ALBUTEROL SULFATE 3 ML: .5; 3 SOLUTION RESPIRATORY (INHALATION) at 16:55

## 2022-08-30 RX ADMIN — HYDROMORPHONE HYDROCHLORIDE 2 MG: 2 TABLET ORAL at 21:37

## 2022-08-30 RX ADMIN — CEFTRIAXONE SODIUM 1 G: 1 INJECTION, POWDER, FOR SOLUTION INTRAMUSCULAR; INTRAVENOUS at 16:46

## 2022-08-30 RX ADMIN — ROSUVASTATIN CALCIUM 10 MG: 10 TABLET, FILM COATED ORAL at 21:37

## 2022-08-30 RX ADMIN — SENNOSIDES 1 TABLET: 8.6 TABLET, FILM COATED ORAL at 21:36

## 2022-08-30 RX ADMIN — WARFARIN SODIUM 2.5 MG: 2.5 TABLET ORAL at 21:37

## 2022-08-30 RX ADMIN — ZOLPIDEM TARTRATE 5 MG: 5 TABLET ORAL at 21:36

## 2022-08-30 RX ADMIN — IOHEXOL 62 ML: 350 INJECTION, SOLUTION INTRAVENOUS at 17:38

## 2022-08-30 RX ADMIN — DILTIAZEM HYDROCHLORIDE 120 MG: 120 CAPSULE, COATED, EXTENDED RELEASE ORAL at 21:36

## 2022-08-30 RX ADMIN — PREDNISONE 5 MG: 5 TABLET ORAL at 21:37

## 2022-08-30 ASSESSMENT — PATIENT HEALTH QUESTIONNAIRE - PHQ9: SUM OF ALL RESPONSES TO PHQ9 QUESTIONS 1 & 2: 0

## 2022-08-30 NOTE — H&P
Internal Medicine  History & Physical        CHIEF COMPLAINT   SOB      HISTORY OF PRESENT ILLNESS      This is a 87 y.o. 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     Of note, patient had recurrent COPD exacerbations between April and May which required hospitalization medical Center.  She completed a course of steroids for COPD exacerbation on 07/20/2022.  She also tells me that she was evaluated by cardiologist at [facility] recently and was told that she may have fluid retention around the lungs and heart.  She takes torsemide for diuresis.    Patient tells me that she has been suffering from chronic shortness of breath over the last few months with recurrent COPD exacerbations requiring hospitalization.  However over the last week, she had failed worsening shortness of breath with minimal exertion.  She was short of breath even trying to walk towards her toilet. Denies orthopnea, PND, worsened lower extremity swelling.  She has also been experiencing increasing cough with white phlegm production.  She denies any hemoptysis, rhinorrhea, sore throat, fever/chills, palpitations, chest pain, diarrhea.  She has not had any sick contacts.   She has only recently been told by her doctor to start using oxygen all day long instead of nightly only.     She lives by herself.  She is an ex-smoker, quit 50 years ago.  Patient has no allergy to penicillin     In the ED:  VS:  T-98.6 -->82, RR 26--> 20, -150, 91% on room air--> 96% on 2 L NC  Labs  WBC 12.49 with neutrophil predominance/ Hgb 10.8 (baselne 9-10s), INR 1.8, Cr 1, Lactate 1.4 / HSTrop 14-->14  BNP-344, troponin delta negative  EKG NSR    Imaging   CXR R basilar opacity   CTPE: no evidence of PE to prox seg branches, RLL opacification PNA/pneumonitis/atelectasis, midline sternotomy, compression deformity T7/T9 with mild retropulsion  COVID neg      Treatment   S/p 1 g CTX, azithromycin    Patient remains limited code (resuscitation but no intubation) for this admission   PAST MEDICAL AND SURGICAL HISTORY      PMHx:  No past medical history on file.    PSHx:  No past surgical history on file.    PCP:   Parker Rodriguez MD (Inactive)    MEDICATIONS      Prior to Admission medications    Medication Sig Start Date End Date Taking? Authorizing Provider   acetaminophen (TYLENOL) 325 mg tablet Take 3 tablets (975 mg total) by mouth every 6 (six) hours. 9/3/18  Yes Marita Roberts CRNP   diltiazem LA (CARDIZEM LA) 120 mg 24 hr tablet Take 360 mg by mouth daily.   Yes Provider, MD Kingsley   fexofenadine (ALLEGRA) 180 mg tablet Take 180 mg by mouth daily.   Yes Provider, MD Kingsley   fluticasone-vilanterol (BREO ELLIPTA) 100-25 mcg/dose powder for inhalation Inhale 1 puff daily. Rinse mouth with water after use to reduce aftertaste and incidence of candidiasis. Do not swallow.   Yes Provider, MD Kingsley   HYDROmorphone (DILAUDID) 2 mg tablet Take 1 tablet (2 mg total) by mouth every 4 (four) hours as needed (pain). Take 1 tablet for moderate pain on a scale of 4-7 and take 2 tablets for severe pain on a scale of 8-10. 9/3/18  Yes Marita Roberts CRNP   lidocaine (ASPERCREME) 4 % adhesive patch,medicated topical patch Apply 1 patch topically daily. Apply to L chest wall.  Available over-the-counter.  May use ointment cream or patch. 9/4/18  Yes Marita Roberts CRNP   polyethylene glycol (MIRALAX) 17 gram packet Take 17 g by mouth daily.   Yes Provider, MD Kingsley   rosuvastatin (CRESTOR) 40 mg tablet Take 40 mg by mouth daily.   Yes ProviderKingsley MD   sennosides-docusate sodium (SENOKOT-S) 8.6-50 mg Take 1 tablet by mouth 2 (two) times a day. Continue while taking prescription pain medicine to prevent opioid-induced constipation.   Available over-the-counter. 9/3/18  Yes Marita Roberts CRNP   tiotropium (SPIRIVA) 18 mcg per inhalation capsule Place 1  capsule into inhaler and inhale daily.   Yes Provider, MD Kingsley   warfarin (COUMADIN) 2.5 mg tablet Take 2.5 mg by mouth once daily. 5mg every Monday, Wednesday, Friday; takes 2.5mg all other days.   Yes ProviderKingsley MD   zolpidem (AMBIEN) 5 mg tablet Take 12.5 mg by mouth nightly as needed for sleep.   Yes ProviderKingsley MD   warfarin (COUMADIN) no dose today NO dose tonight 9/3/2018 9/3/18   Marita Roberts CRNP       Home medications were personally reviewed.    ALLERGIES      Codeine    FAMILY HISTORY      No family history on file.    SOCIAL HISTORY      Social History     Socioeconomic History    Marital status:    Tobacco Use    Smoking status: Former Smoker     Types: Cigarettes    Smokeless tobacco: Former User    Tobacco comment: quit 40 years ago   Substance and Sexual Activity    Alcohol use: Yes     Alcohol/week: 2.0 standard drinks     Types: 2 Standard drinks or equivalent per week    Drug use: No     Social Determinants of Health     Food Insecurity: No Food Insecurity    Worried About Running Out of Food in the Last Year: Never true    Ran Out of Food in the Last Year: Never true       REVIEW OF SYSTEMS      As per HPI     PHYSICAL EXAMINATION      Temp:  [37 °C (98.6 °F)] 37 °C (98.6 °F)  Heart Rate:  [] 90  Resp:  [20-33] 20  BP: (137-154)/(62-93) 137/93  Body mass index is 24 kg/m².    Physical Exam  Constitutional:       Appearance: She is ill-appearing (chronically). She is not diaphoretic.   HENT:      Head: Normocephalic.      Nose: Nose normal.      Mouth/Throat:      Mouth: Mucous membranes are moist.   Eyes:      Conjunctiva/sclera: Conjunctivae normal.   Cardiovascular:      Rate and Rhythm: Normal rate and regular rhythm.      Pulses: Normal pulses.      Heart sounds: Normal heart sounds. No murmur heard.  Pulmonary:      Effort: Pulmonary effort is normal.      Comments: Non wheezy   Diminished air movement at bases  Abdominal:      General: Bowel  sounds are normal. There is distension.      Palpations: Abdomen is soft.      Tenderness: There is no abdominal tenderness.   Musculoskeletal:      Comments: kyphosis   Skin:     General: Skin is warm and dry.   Neurological:      Mental Status: She is alert and oriented to person, place, and time.         LABS / IMAGING / EKG        Labs:  Results from last 7 days   Lab Units 08/30/22  1313   WBC K/uL 12.49*   HEMOGLOBIN g/dL 10.8*   HEMATOCRIT % 34.7*   PLATELETS K/uL 265     Results from last 7 days   Lab Units 08/30/22  1313   SODIUM mEQ/L 136   POTASSIUM mEQ/L 3.9   CHLORIDE mEQ/L 92*   CO2 mEQ/L 34*   BUN mg/dL 15   CREATININE mg/dL 1.0   CALCIUM mg/dL 9.0   ALBUMIN g/dL 2.8*   BILIRUBIN TOTAL mg/dL 1.0   ALK PHOS IU/L 96   ALT IU/L 15   AST IU/L 23   GLUCOSE mg/dL 112*         Microbiology Data personally reviewed:  Microbiology Results     Procedure Component Value Units Date/Time    SARS-CoV-2 (COVID-19), PCR Nasopharynx [[id number]]  (Normal) Collected: 08/30/22 1322    Specimen: Nasopharyngeal Swab from Nasopharynx Updated: 08/30/22 1420    Narrative:      The following orders were created for panel order SARS-CoV-2 (COVID-19), PCR Nasopharynx.  Procedure                               Abnormality         Status                     ---------                               -----------         ------                     SARS-COV-2 (COVID-19)/ F...[[id number]]  Normal              Final result                 Please view results for these tests on the individual orders.    SARS-COV-2 (COVID-19)/ FLU A/B, AND RSV, PCR Nasopharynx [[id number]]  (Normal) Collected: 08/30/22 1322    Specimen: Nasopharyngeal Swab from Nasopharynx Updated: 08/30/22 1420     SARS-CoV-2 (COVID-19) Negative     Influenza A Negative     Influenza B Negative     Respiratory Syncytial Virus Negative    Narrative:      Testing performed using real-time PCR for detection of COVID-19. EUA approved validation studies performed on site.            Imaging personally reviewed(does not include unread studies):  CT ANGIOGRAPHY CHEST PULMONARY EMBOLISM WITH IV CONTRAST    Result Date: 8/30/2022  IMPRESSION: 1.  No evidence of acute pulmonary embolism to the level of the proximal segmental branches. 2.  Opacification of multiple airways in the right lower lobe beginning at the right mainstem bronchus with dependent airspace opacity at the right lower lobe which could represent aspiration pneumonia/pneumonitis/atelectasis. 3.  Widely  midline sternotomy as in comment. 4.  Severe age-indeterminate compression deformity at T7 and T9 with mild retropulsion of the inferior vertebral body osseous fragments and mild spinal canal narrowing at these levels. 5.  See comment for additional findings.     X-RAY CHEST 1 VIEW    Result Date: 8/30/2022  IMPRESSION: Right basilar opacity.      ASSESSMENT AND PLAN           * Pneumonia of right lower lobe due to infectious organism  Assessment & Plan  Patient with worsening shortness of breath over the last week, increased phlegm on background hx of COPD   Covid negative; lactate wnl    CXR R basilar opacity   CTPE: no evidence of PE to prox seg branches, RLL opacification PNA/pneumonitis/atelectasis, midline sternotomy, compression deformity T7/T9 with mild retropulsion    - Continue CTX and azithromycin for 5 days   - Trend CBC   - Trend fever curve  - Hold off on steroids   - Incentive spirometry given restrictive lung physiology   - f/u sputum and blood culture, MRSA nares, urine legionella   - unclear if underlying heart failure given patient on torsemide. Continue torsemide - monitor for worsening renal function   - Strict I/O, daily standing weights        Anemia  Assessment & Plan  Hgb 10.8 (baseline 9-10s)  No active bleeding    - Trend with daily CBC  - iron studies, ferritin, folate, b12    Vitamin D deficiency  Assessment & Plan  Hx of vitamin D deficiency and osteoporosis   Follows at [facility] with  Severe age-indeterminate compression deformity at T7 and T9 with mild   retropulsion of the inferior vertebral body osseous fragments and mild spinal   canal narrowing at these levels.     - outpatient follow up for osteoporosis   - follow up vitamin D level  - dilaudid PRN home dose for pain       H/O aortic valve replacement  Assessment & Plan  Hx of CABG and AVR   Previously followed at [place] and has cardiologist at [facility]   On warfarin     - continue warfarin by pharmacy protocol   - on diltiazem 3 times daily for blood pressure and rate control     COPD (chronic obstructive pulmonary disease) (CMS/[facility])  Assessment & Plan  Patient with history of recurrent COPD exacerbations and restrictive lung disease.  She is on trelegy   On home oxygen 2.5 L nightly however has been taking continuously last 2 weeks per her pulm request   Last COPD exacerbation requiring steroids 7/2022  Follows with pulm at [facility]   Unable to bring her Trelegy     Non wheezy on exam. Do not suspect an acute COPD exacerbation     - Maintenance inhaler- Spiriva and Dulera  - Duoneb q6 PRN   - vbg to assess CO2      Anxiety disorder, unspecified  Assessment & Plan  Continue [facility]pidem nightly   [facility]        V[facility] Assessment: [place]    VTE Prophylaxis: Current anticoagulants:  warfarin (COUMADIN) therapy by pharmacy protocol, Other, evaluate daily      Palliative Care Screen:    Code Status: Limited Code  Estimated discharge date: 9/2/2022     ATTENDING DOCUMENTATION  ALSO SEE ATTENDING ATTESTATION SECTION OF NOTE

## 2022-08-31 LAB
ANION GAP SERPL CALC-SCNC: 5 MEQ/L (ref 3–15)
ATRIAL RATE: 80
ATRIAL RATE: 96
BASE EXCESS BLDV CALC-SCNC: 8.5 MEQ/L
BASOPHILS # BLD: 0.01 K/UL (ref 0.01–0.1)
BASOPHILS NFR BLD: 0.1 %
BUN SERPL-MCNC: 15 MG/DL (ref 8–20)
CALCIUM SERPL-MCNC: 8.6 MG/DL (ref 8.9–10.3)
CHLORIDE SERPL-SCNC: 101 MEQ/L (ref 98–109)
CHOLEST SERPL-MCNC: 150 MG/DL
CO2 BLDV-SCNC: 37 MEQ/L (ref 22–32)
CO2 SERPL-SCNC: 34 MEQ/L (ref 22–32)
CREAT SERPL-MCNC: 1 MG/DL (ref 0.6–1.1)
DIFFERENTIAL METHOD BLD: ABNORMAL
EOSINOPHIL # BLD: 0.06 K/UL (ref 0.04–0.36)
EOSINOPHIL NFR BLD: 0.8 %
ERYTHROCYTE [DISTWIDTH] IN BLOOD BY AUTOMATED COUNT: 16.1 % (ref 11.7–14.4)
FERRITIN SERPL-MCNC: 34 NG/ML (ref 11–250)
FIO2 ON VENT: ABNORMAL %
FOLATE SERPL-MCNC: 6.7 NG/ML
GFR SERPL CREATININE-BSD FRML MDRD: 52.6 ML/MIN/1.73M*2
GLUCOSE SERPL-MCNC: 108 MG/DL (ref 70–99)
HCO3 BLDV-SCNC: 30.3 MEQ/L (ref 21–28)
HCT VFR BLDCO AUTO: 32.3 % (ref 35–45)
HDLC SERPL-MCNC: 57 MG/DL
HDLC SERPL: 2.6 {RATIO}
HGB BLD-MCNC: 9.8 G/DL (ref 11.8–15.7)
IMM GRANULOCYTES # BLD AUTO: 0.05 K/UL (ref 0–0.08)
IMM GRANULOCYTES NFR BLD AUTO: 0.6 %
INHALED O2 CONCENTRATION: ABNORMAL %
INR PPP: 2.2
IRON SATN MFR SERPL: 6 % (ref 15–45)
IRON SERPL-MCNC: 23 UG/DL (ref 35–150)
LDLC SERPL CALC-MCNC: 83 MG/DL
LYMPHOCYTES # BLD: 1.11 K/UL (ref 1.2–3.5)
LYMPHOCYTES NFR BLD: 14.3 %
MAGNESIUM SERPL-MCNC: 2.5 MG/DL (ref 1.8–2.5)
MCH RBC QN AUTO: 24.9 PG (ref 28–33.2)
MCHC RBC AUTO-ENTMCNC: 30.3 G/DL (ref 32.2–35.5)
MCV RBC AUTO: 82.2 FL (ref 83–98)
MONOCYTES # BLD: 0.66 K/UL (ref 0.28–0.8)
MONOCYTES NFR BLD: 8.5 %
NEUTROPHILS # BLD: 5.88 K/UL (ref 1.7–7)
NEUTS SEG NFR BLD: 75.7 %
NONHDLC SERPL-MCNC: 93 MG/DL
NRBC BLD-RTO: 0 %
P AXIS: 46
P AXIS: 52
PCO2 BLDV: 57 MM HG (ref 41–51)
PDW BLD AUTO: 10.4 FL (ref 9.4–12.3)
PH BLDV: 7.4 [PH] (ref 7.32–7.42)
PLATELET # BLD AUTO: 251 K/UL (ref 150–369)
PO2 BLDV: 30 MM HG (ref 25–40)
POTASSIUM SERPL-SCNC: 4.8 MEQ/L (ref 3.6–5.1)
PR INTERVAL: 158
PR INTERVAL: 166
PROTHROMBIN TIME: 23.8 SEC (ref 12.2–14.5)
QRS DURATION: 106
QRS DURATION: 108
QT INTERVAL: 372
QT INTERVAL: 410
QTC CALCULATION(BAZETT): 469
QTC CALCULATION(BAZETT): 472
R AXIS: -26
R AXIS: 6
RBC # BLD AUTO: 3.93 M/UL (ref 3.93–5.22)
SODIUM SERPL-SCNC: 140 MEQ/L (ref 136–144)
T WAVE AXIS: 49
T WAVE AXIS: 66
TIBC SERPL-MCNC: 402 UG/DL (ref 270–460)
TRIGL SERPL-MCNC: 48 MG/DL (ref 30–149)
UIBC SERPL-MCNC: 379 UG/DL (ref 180–360)
VENTRICULAR RATE: 80
VENTRICULAR RATE: 96
VIT B12 SERPL-MCNC: 666 PG/ML (ref 180–914)
WBC # BLD AUTO: 7.77 K/UL (ref 3.8–10.5)

## 2022-08-31 PROCEDURE — 63700000 HC SELF-ADMINISTRABLE DRUG: Performed by: STUDENT IN AN ORGANIZED HEALTH CARE EDUCATION/TRAINING PROGRAM

## 2022-08-31 PROCEDURE — 93010 ELECTROCARDIOGRAM REPORT: CPT | Mod: 77 | Performed by: INTERNAL MEDICINE

## 2022-08-31 PROCEDURE — 36415 COLL VENOUS BLD VENIPUNCTURE: CPT

## 2022-08-31 PROCEDURE — 25800000 HC PHARMACY IV SOLUTIONS

## 2022-08-31 PROCEDURE — 63600000 HC DRUGS/DETAIL CODE

## 2022-08-31 PROCEDURE — 92610 EVALUATE SWALLOWING FUNCTION: CPT | Mod: GN

## 2022-08-31 PROCEDURE — 85025 COMPLETE CBC W/AUTO DIFF WBC: CPT

## 2022-08-31 PROCEDURE — 63700000 HC SELF-ADMINISTRABLE DRUG: Performed by: HOSPITALIST

## 2022-08-31 PROCEDURE — 25000000 HC PHARMACY GENERAL

## 2022-08-31 PROCEDURE — 80061 LIPID PANEL: CPT

## 2022-08-31 PROCEDURE — 82803 BLOOD GASES ANY COMBINATION: CPT

## 2022-08-31 PROCEDURE — 83550 IRON BINDING TEST: CPT

## 2022-08-31 PROCEDURE — 80048 BASIC METABOLIC PNL TOTAL CA: CPT

## 2022-08-31 PROCEDURE — 82607 VITAMIN B-12: CPT

## 2022-08-31 PROCEDURE — 93005 ELECTROCARDIOGRAM TRACING: CPT

## 2022-08-31 PROCEDURE — 21400000 HC ROOM AND CARE CCU/INTERMEDIATE

## 2022-08-31 PROCEDURE — 82652 VIT D 1 25-DIHYDROXY: CPT

## 2022-08-31 PROCEDURE — 63700000 HC SELF-ADMINISTRABLE DRUG

## 2022-08-31 PROCEDURE — 97161 PT EVAL LOW COMPLEX 20 MIN: CPT | Mod: GP

## 2022-08-31 PROCEDURE — 82728 ASSAY OF FERRITIN: CPT

## 2022-08-31 PROCEDURE — 83735 ASSAY OF MAGNESIUM: CPT

## 2022-08-31 PROCEDURE — 93010 ELECTROCARDIOGRAM REPORT: CPT | Performed by: INTERNAL MEDICINE

## 2022-08-31 PROCEDURE — 82746 ASSAY OF FOLIC ACID SERUM: CPT

## 2022-08-31 PROCEDURE — 99233 SBSQ HOSP IP/OBS HIGH 50: CPT | Performed by: INTERNAL MEDICINE

## 2022-08-31 PROCEDURE — 85610 PROTHROMBIN TIME: CPT

## 2022-08-31 PROCEDURE — 25000000 HC PHARMACY GENERAL: Performed by: STUDENT IN AN ORGANIZED HEALTH CARE EDUCATION/TRAINING PROGRAM

## 2022-08-31 RX ORDER — LORAZEPAM 0.5 MG/1
0.5 TABLET ORAL ONCE
Status: COMPLETED | OUTPATIENT
Start: 2022-08-31 | End: 2022-08-31

## 2022-08-31 RX ORDER — WARFARIN 2.5 MG/1
2.5 TABLET ORAL ONCE
Status: COMPLETED | OUTPATIENT
Start: 2022-08-31 | End: 2022-08-31

## 2022-08-31 RX ORDER — GUAIFENESIN 600 MG/1
600 TABLET, EXTENDED RELEASE ORAL 2 TIMES DAILY
Status: DISCONTINUED | OUTPATIENT
Start: 2022-08-31 | End: 2022-09-02 | Stop reason: HOSPADM

## 2022-08-31 RX ORDER — DEXTROSE 50 % IN WATER (D50W) INTRAVENOUS SYRINGE
25 AS NEEDED
Status: DISCONTINUED | OUTPATIENT
Start: 2022-08-31 | End: 2022-09-02 | Stop reason: HOSPADM

## 2022-08-31 RX ORDER — LORAZEPAM 0.5 MG/1
0.5 TABLET ORAL NIGHTLY PRN
Status: DISCONTINUED | OUTPATIENT
Start: 2022-08-31 | End: 2022-09-02 | Stop reason: HOSPADM

## 2022-08-31 RX ORDER — IPRATROPIUM BROMIDE AND ALBUTEROL SULFATE 2.5; .5 MG/3ML; MG/3ML
3 SOLUTION RESPIRATORY (INHALATION) EVERY 6 HOURS
Status: DISCONTINUED | OUTPATIENT
Start: 2022-08-31 | End: 2022-09-02 | Stop reason: HOSPADM

## 2022-08-31 RX ORDER — IBUPROFEN 200 MG
16-32 TABLET ORAL AS NEEDED
Status: DISCONTINUED | OUTPATIENT
Start: 2022-08-31 | End: 2022-09-02 | Stop reason: HOSPADM

## 2022-08-31 RX ORDER — DEXTROSE 40 %
15-30 GEL (GRAM) ORAL AS NEEDED
Status: DISCONTINUED | OUTPATIENT
Start: 2022-08-31 | End: 2022-09-02 | Stop reason: HOSPADM

## 2022-08-31 RX ADMIN — CEFTRIAXONE SODIUM 1 G: 1 INJECTION, POWDER, FOR SOLUTION INTRAVENOUS at 16:45

## 2022-08-31 RX ADMIN — ROSUVASTATIN CALCIUM 10 MG: 10 TABLET, FILM COATED ORAL at 21:25

## 2022-08-31 RX ADMIN — WARFARIN SODIUM 2.5 MG: 2.5 TABLET ORAL at 16:44

## 2022-08-31 RX ADMIN — MOMETASONE FUROATE AND FORMOTEROL FUMARATE DIHYDRATE 2 PUFF: 200; 5 AEROSOL RESPIRATORY (INHALATION) at 06:28

## 2022-08-31 RX ADMIN — POLYETHYLENE GLYCOL 3350 17 G: 17 POWDER, FOR SOLUTION ORAL at 08:37

## 2022-08-31 RX ADMIN — HYDROMORPHONE HYDROCHLORIDE 2 MG: 2 TABLET ORAL at 15:27

## 2022-08-31 RX ADMIN — DILTIAZEM HYDROCHLORIDE 120 MG: 120 CAPSULE, COATED, EXTENDED RELEASE ORAL at 20:04

## 2022-08-31 RX ADMIN — LORAZEPAM 0.5 MG: 0.5 TABLET ORAL at 17:20

## 2022-08-31 RX ADMIN — GUAIFENESIN 600 MG: 600 TABLET ORAL at 20:04

## 2022-08-31 RX ADMIN — ACETAMINOPHEN 975 MG: 325 TABLET ORAL at 06:26

## 2022-08-31 RX ADMIN — HYDROMORPHONE HYDROCHLORIDE 2 MG: 2 TABLET ORAL at 21:25

## 2022-08-31 RX ADMIN — DILTIAZEM HYDROCHLORIDE 120 MG: 120 CAPSULE, COATED, EXTENDED RELEASE ORAL at 08:37

## 2022-08-31 RX ADMIN — SODIUM CHLORIDE 125 MG: 9 INJECTION, SOLUTION INTRAVENOUS at 11:30

## 2022-08-31 RX ADMIN — GUAIFENESIN 600 MG: 600 TABLET ORAL at 08:37

## 2022-08-31 RX ADMIN — SENNOSIDES 1 TABLET: 8.6 TABLET, FILM COATED ORAL at 21:25

## 2022-08-31 RX ADMIN — IPRATROPIUM BROMIDE AND ALBUTEROL SULFATE 3 ML: .5; 3 SOLUTION RESPIRATORY (INHALATION) at 16:45

## 2022-08-31 RX ADMIN — DILTIAZEM HYDROCHLORIDE 120 MG: 120 CAPSULE, COATED, EXTENDED RELEASE ORAL at 13:38

## 2022-08-31 RX ADMIN — TORSEMIDE 10 MG: 10 TABLET ORAL at 20:04

## 2022-08-31 RX ADMIN — ZOLPIDEM TARTRATE 5 MG: 5 TABLET ORAL at 21:25

## 2022-08-31 RX ADMIN — PREDNISONE 5 MG: 5 TABLET ORAL at 08:37

## 2022-08-31 RX ADMIN — AZITHROMYCIN MONOHYDRATE 500 MG: 500 INJECTION, POWDER, LYOPHILIZED, FOR SOLUTION INTRAVENOUS at 15:27

## 2022-08-31 RX ADMIN — HYDROMORPHONE HYDROCHLORIDE 2 MG: 2 TABLET ORAL at 13:38

## 2022-08-31 RX ADMIN — TORSEMIDE 10 MG: 10 TABLET ORAL at 08:37

## 2022-08-31 RX ADMIN — IPRATROPIUM BROMIDE AND ALBUTEROL SULFATE 3 ML: .5; 3 SOLUTION RESPIRATORY (INHALATION) at 13:00

## 2022-08-31 ASSESSMENT — COGNITIVE AND FUNCTIONAL STATUS - GENERAL
MOVING TO AND FROM BED TO CHAIR: 4 - NONE
WALKING IN HOSPITAL ROOM: 4 - NONE
FOLLOWS FAMILIAR CONVERSATION: 4 - NONE
CLIMB 3 TO 5 STEPS WITH RAILING: 4 - NONE
AFFECT: WFL
TAKING CARE OF COMPLICATED TASKS: 4 - NONE
REMEMBERING 5 ERRANDS WITH NO LIST: 4 - NONE
REMEMBERING TO TAKE MEDICATION: 4 - NONE
AFFECT: WFL
UNDERSTANDING 10 TO 15 MIN SPEECH: 4 - NONE
REMEMBERING WHERE THINGS ARE: 4 - NONE
STANDING UP FROM CHAIR USING ARMS: 4 - NONE

## 2022-08-31 NOTE — ASSESSMENT & PLAN NOTE
Patient with history of recurrent COPD exacerbations and restrictive lung disease.  She is on trelegy   On home oxygen 2.5 L nightly however has been taking continuously last 2 weeks per her pulm request   Last COPD exacerbation requiring steroids 7/2022  Follows with pulm at [place]   Unable to bring her Trelegy   Non wheezy on exam initially. Do not suspect an acute COPD exacerbation     - Maintenance inhaler- Spiriva and Dulera  - Duoneb q6 PRN   - vbg to assess CO2

## 2022-08-31 NOTE — PROGRESS NOTES
Spoke with patient and confirmed with medication list from [facility] and/or patient's own pharmacy to complete the medication reconciliation.     Prior to admission medication list:    acetaminophen (TYLENOL) 325 mg tablet, Take 3 tablets (975 mg total) by mouth every 6 (six) hours.    albuterol HFA (VENTOLIN HFA) 90 mcg/actuation inhaler, Inhale 2 puffs every 6 (six) hours as needed for wheezing.    diltiazem LA (CARDIZEM LA) 120 mg 24 hr tablet, Take 120 mg by mouth 3 (three) times a day.    fluticasone-umeclidinium-vilanterol (TRELEGY ELLIPTA) 100-62.5-25 mcg blister with device powder for inhalation, Inhale 1 puff daily.    HYDROmorphone (DILAUDID) 2 mg tablet, Take 2-4 mg by mouth every 4 (four) hours as needed for moderate pain or severe pain.    ipratropium (ATROVENT) 0.02 % nebulizer solution, Take 500 mcg by nebulization 4 (four) times a day as needed for wheezing or shortness of breath.    LORazepam (ATIVAN) 0.5 mg tablet, Take 0.5 mg by mouth every 6 (six) hours as needed for anxiety.    meclizine (ANTIVERT) 12.5 mg tablet, Take 12.5 mg by mouth 3 (three) times a day as needed for dizziness.    predniSONE (DELTASONE) 5 mg tablet, Take 5 mg by mouth daily.    rosuvastatin (CRESTOR) 40 mg tablet, Take 40 mg by mouth nightly.    senna (SENOKOT) 8.6 mg tablet, Take 1 tablet by mouth daily.    torsemide (DEMADEX) 5 mg tablet, Take 10 mg by mouth 2 (two) times a day.    warfarin (COUMADIN) 2.5 mg tablet, Take 2.5 mg by mouth every evening.    zolpidem (AMBIEN) 10 mg tablet, Take 10 mg by mouth nightly as needed (sleep, in addition to 5 mg).    zolpidem (AMBIEN) 5 mg tablet, Take 5 mg by mouth nightly.    Comments about home medications:  - Patient reports her doctor instructed her to increase torsemide to 10 mg twice daily.     - Patient states she takes diltiazem  mg three times daily instead of 3 tablets once daily as prescribed.     - Last dose of warfarin was yesterday 8/29/22.  Confirmed regimen with patient.     Compliance:   - All medications have recent fill history or can be found as OTC products.

## 2022-08-31 NOTE — PLAN OF CARE
Problem: Adult Inpatient Plan of Care  Goal: Plan of Care Review  Outcome: Met  Flowsheets (Taken 8/31/2022 [id number])  Progress: improving  Plan of Care Reviewed With: patient  Outcome Summary: pt is independent and at baseline with all mobility, only limtied by mild desat with mobility. Rec return to independent living facility at d/c     Problem: Mobility Impairment  Goal: Optimal Mobility  Outcome: Met

## 2022-08-31 NOTE — PROGRESS NOTES
Internal Medicine  Daily Progress Note       SUBJECTIVE   This is a 87 y.o. year-old female admitted on 8/30/2022 with Pneumonia of right lower lobe due to infectious organism [J18.9].    Interval History: I observed and examined  today at the bedside. She appeared comfortable when I went and examined her, alert, sitting up in bed, saturating appropriately on 3L nasal cannula. There were no reported events overnight.     denied any chest pain or shortness of breath now that she's on oxygen.     OBJECTIVE   Vital signs in last 24 hours:  Temp:  [36.6 °C (97.8 °F)-37 °C (98.6 °F)] 36.6 °C (97.8 °F)  Heart Rate:  [] 88  Resp:  [20-33] 20  BP: (117-154)/(58-93) 117/83  SpO2:  [91 %-100 %] 96 %  Oxygen Therapy: Supplemental oxygen  O2 Delivery Method: Nasal cannula  O2 Flow Rate (L/min):  [2 L/min-3 L/min] 3 L/min    Weight & I/Os:  Weights (last 5 days)     Date/Time Weight    08/31/22 0600 55 kg (121 lb 4.8 oz)    08/30/22 2051 55.7 kg (122 lb 14.4 oz)    08/30/22 1247 53.5 kg (118 lb)          Intake/Output Summary (Last 24 hours) at 8/31/2022 0659  Last data filed at 8/30/2022 1855  24 Hour Net Input/Output from 7AM Yesterday   Intake 350 ml   Output --   Net 350 ml        PHYSICAL EXAMINATION   Physical Exam  Vitals reviewed.   Constitutional:       General: She is not in acute distress.     Appearance: Normal appearance.   HENT:      Head: Atraumatic.      Right Ear: External ear normal.      Left Ear: External ear normal.      Nose: Nose normal.      Mouth/Throat:      Mouth: Mucous membranes are moist.   Eyes:      Extraocular Movements: Extraocular movements intact.      Conjunctiva/sclera: Conjunctivae normal.   Neck:      Vascular: No carotid bruit.   Cardiovascular:      Rate and Rhythm: Normal rate and regular rhythm.      Pulses: Normal pulses.      Heart sounds: Murmur heard.      Comments: Mechanical valve  Pulmonary:      Effort: Pulmonary effort is normal. No respiratory distress.       Breath sounds: Rales present.      Comments: Rales RLL  Abdominal:      General: Abdomen is flat. Bowel sounds are normal.      Palpations: Abdomen is soft.      Tenderness: There is no abdominal tenderness. There is no guarding.   Musculoskeletal:      Cervical back: Neck supple. No tenderness.      Right lower leg: No edema.      Left lower leg: No edema.   Lymphadenopathy:      Cervical: No cervical adenopathy.   Skin:     General: Skin is warm.   Neurological:      General: No focal deficit present.      Mental Status: She is alert and oriented to person, place, and time.          LINES, CATHETERS, DRAINS, AIRWAYS, & WOUNDS   Lines, drains, airways, & wounds:  Peripheral IV (Adult) 08/30/22 Anterior;Right Hand (Active)   Number of days: 1       Peripheral IV (Adult) 08/30/22 Anterior;Left Hand (Active)   Number of days: 1        LABS, IMAGING, & TELE   Labs:  I have reviewed the patient's pertinent labs.  Significant abnormals are WBC 7.77 from 12.49, Hb 9.8.    Results from last 7 days   Lab Units 08/31/22  0451 08/30/22  1313   WBC K/uL 7.77 12.49*   HEMOGLOBIN g/dL 9.8* 10.8*   HEMATOCRIT % 32.3* 34.7*   PLATELETS K/uL 251 265       Results from last 7 days   Lab Units 08/31/22  0451 08/30/22  1313   SODIUM mEQ/L 140 136   POTASSIUM mEQ/L 4.8 3.9   CHLORIDE mEQ/L 101 92*   CO2 mEQ/L 34* 34*   BUN mg/dL 15 15   CREATININE mg/dL 1.0 1.0   CALCIUM mg/dL 8.6* 9.0   ALBUMIN g/dL  --  2.8*   BILIRUBIN TOTAL mg/dL  --  1.0   ALK PHOS IU/L  --  96   ALT IU/L  --  15   AST IU/L  --  23   GLUCOSE mg/dL 108* 112*     Imaging personally reviewed (does not include unread studies):  CT ANGIOGRAPHY CHEST PULMONARY EMBOLISM WITH IV CONTRAST    Result Date: 8/30/2022  CLINICAL HISTORY: Pulmonary embolism (PE) suspected, high prob TECHNIQUE:   Enhanced CT Angiogram examination of the pulmonary arteries/chest was performed from the lung apices through the adrenal glands including thin slices according to acute pulmonary  embolism protocol. 3D MIP and/or volume rendered image reconstruction performed and reviewed. 62 mL Omnipaque 350 was administered intravenously without event. CT DOSE:  One or more dose reduction techniques (e.g. automated exposure control, adjustment of the mA and/or kV according to patient size, use of iterative reconstruction technique) utilized for this examination. COMPARISON:  None available COMMENT: VESSELS: There is no evidence of acute pulmonary embolism to the level of the proximal segmental branches. The main pulmonary artery is mildly prominent in caliber, which may indicate pulmonary hypertension. The thoracic aorta is normal in caliber with extensive atherosclerotic calcification. AIRWAYS, LUNGS, AND PLEURA: The central airways are patent. There is opacification of multiple airways in the right lower lobe beginning at the right mainstem bronchus with dependent airspace opacity at the right lower lobe which could represent aspiration pneumonia/pneumonitis/atelectasis. There is no large pleural effusion. There is no pneumothorax. LOWER NECK: Visualized thyroid is unremarkable. ESOPHAGUS: Within normal limits. HEART AND MEDIASTINUM: There are postsurgical changes in the mediastinum with a prosthetic aortic valve and CABG type postsurgical changes. There are extensive coronary artery calcifications and there is mitral annular calcification. No pericardial effusion. LYMPH NODES: Within normal limits. UPPER ABDOMEN: Limited visualization demonstrates vascular calcifications and cholecystectomy clips. CHEST WALL AND BONES: The sternum is widely  status post median sternotomy and there is some herniation of the right lung into this defect. There are degenerative changes. There is age indeterminate severe compression deformity at T7 and T9 with mild retropulsion of the inferior vertebral body osseous fragments and mild spinal canal narrowing at these levels. There is osteopenia with exaggerated  kyphosis.     IMPRESSION: 1.  No evidence of acute pulmonary embolism to the level of the proximal segmental branches. 2.  Opacification of multiple airways in the right lower lobe beginning at the right mainstem bronchus with dependent airspace opacity at the right lower lobe which could represent aspiration pneumonia/pneumonitis/atelectasis. 3.  Widely  midline sternotomy as in comment. 4.  Severe age-indeterminate compression deformity at T7 and T9 with mild retropulsion of the inferior vertebral body osseous fragments and mild spinal canal narrowing at these levels. 5.  See comment for additional findings.     X-RAY CHEST 1 VIEW    Result Date: 8/30/2022  CLINICAL HISTORY: Dyspnea. COMMENT: A single frontal radiograph of the chest is obtained, and compared to previous exam from 9/9/2018. The heart is normal in size. Atherosclerotic calcification of the thoracic aorta. Right basilar opacity, not present on previous examination. No effusion or pneumothorax. Degenerative changes right shoulder.     IMPRESSION: Right basilar opacity.    Telemetry/EKG:  I have independently reviewed the ECG. Significant findings include Physiologic left axis, sinus, numerous PACs.     ASSESSMENT & PLAN   * Pneumonia of right lower lobe due to infectious organism  Assessment & Plan  Patient with worsening shortness of breath over the last week, increased phlegm on background hx of COPD   Covid negative; lactate wnl    CXR R basilar opacity   CTPE: no evidence of PE to prox seg branches, RLL opacification PNA/pneumonitis/atelectasis, midline sternotomy, compression deformity T7/T9 with mild retropulsion    - Continue CTX and azithromycin for 5 days   - Trend CBC   - Trend fever curve  - Hold off on steroids   - Incentive spirometry given restrictive lung physiology   - f/u sputum and blood culture, MRSA nares, urine legionella   - unclear if underlying heart failure given patient on torsemide. Continue torsemide - monitor for  worsening renal function   - Strict I/O, daily standing weights  - SLP consult to see if swallowing dysfunction due to PNA in RLL      Anemia  Assessment & Plan  Hgb 10.8 (baseline 9-10s)  No active bleeding  Iron studies - iron + sat low, ferritin low-normal  B12 + folate normal    - Trend with daily CBC  - IV iron 5 days    COPD (chronic obstructive pulmonary disease) (CMS/[facility])  Assessment & Plan  Patient with history of recurrent COPD exacerbations and restrictive lung disease.  She is on trelegy   On home oxygen 2.5 L nightly however has been taking continuously last 2 weeks per her pulm request   Last COPD exacerbation requiring steroids 7/2022  Follows with pulm at [place]   Unable to bring her Trelegy     Non wheezy on exam. Do not suspect an acute COPD exacerbation     - Maintenance inhaler- Spiriva and Dulera  - Duoneb q6 PRN   - vbg to assess CO2      Vitamin D deficiency  Assessment & Plan  Hx of vitamin D deficiency and osteoporosis   Follow[facility] at [facility]   CT with Severe age-indeterminate compression deformity at T7 and T9 with mild   retropulsion of the inferior vertebral body osseous fragments and mild spinal   canal narrowing at these levels.     - outpatient follow up for osteoporosis   - follow up vitamin D level  - dilaudid PRN home dose for pain       H/O aortic valve replacement  Assessment & Plan  Hx of CABG and AVR   Previously followed at [place] and has cardiologist at [facility]   On warfarin     - continue warfarin by pharmacy protocol   - on diltiazem 3 times daily for blood pressure and rate control     Anxiety disorder, unspecified  Assessment & Plan  Continue zolpidem nightly   [facility]        VTE Assessment: [place]    VTE Prophylaxis: Current anticoagulants:  warfarin (COUMADIN) therapy by pharmacy protocol, Other, evaluate daily      Code Status: Limited Code  Estimated discharge date: 9/2/2022     ATTENDING DOCUMENTATION  ALSO SEE ATTENDING ATTESTATION SECTION OF NOTE

## 2022-08-31 NOTE — PATIENT CARE CONFERENCE
Care Progression Rounds Note  Date: 8/31/2022  Time: 1:02 PM     Patient Name: Bonny Howe     Medical Record Number: [id number]   YOB: 1935  Sex: Female      Room/Bed: 0165    Admitting Diagnosis: Pneumonia of right lower lobe due to infectious organism [J18.9]   Admit Date/Time: 8/30/2022 12:42 PM    Primary Diagnosis: Pneumonia of right lower lobe due to infectious organism  Principal Problem: Pneumonia of right lower lobe due to infectious organism    GMLOS: pending  Anticipated Discharge Date: 9/2/2022    AM-PAC:  Mobility Score:      Discharge Planning:  Current Living Arrangements: independent living facility  Concerns to be Addressed: care coordination/care conferences, discharge planning, home safety  Anticipated Discharge Disposition: home without assistance or services  Type of Home Care Services: nursing, home PT, home health aide  Type of Skilled Nursing Care Services: PT, nursing    Barriers to Discharge:  Medical issues not resolved    Comments:       Participants:  pharmacy, physical therapy, , physician, nursing, social work/services

## 2022-08-31 NOTE — PLAN OF CARE
Plan of Care Review  Plan of Care Reviewed With: patient  Progress: improving  Outcome Summary: patient on iv abx and ambulatory 3x in the hallway  o2 3L NC

## 2022-08-31 NOTE — PLAN OF CARE
Problem: Adult Inpatient Plan of Care  Goal: Plan of Care Review  Outcome: Progressing  Flowsheets (Taken 8/31/2022 1223)  Progress: improving  Plan of Care Reviewed With: patient  Outcome Summary: Speech Eval 8/31: POC initiated     Problem: Swallowing Impairment  Goal: Improved Swallowing Without Aspiration  Outcome: Progressing

## 2022-08-31 NOTE — ASSESSMENT & PLAN NOTE
Hx of vitamin D deficiency and osteoporosis   Follows at [facility] with Severe age-indeterminate compression deformity at T7 and T9 with mild   retropulsion of the inferior vertebral body osseous fragments and mild spinal   canal narrowing at these levels.     - outpatient follow up for osteoporosis   - follow up vitamin D level  - dilaudid PRN home dose for pain

## 2022-08-31 NOTE — PROGRESS NOTES
Patient: Bonny Howe  Location:  [place] [address]  MRN:  [id number]  Today's date:  8/31/2022    Spoke with RN prior to PT session and cleared for therapy at this time. Pt left in chair, alarm activated, with personal items and call bell within reach. RN notified at end of session of pt performance.    Bonny is a 87 y.o. female admitted on 8/30/2022 with Pneumonia of right lower lobe due to infectious organism [J18.9]. Principal problem is Pneumonia of right lower lobe due to infectious organism.    Past Medical History  Bonny has no past medical history on file.    History of Present Illness   PNA      PT Vitals    Date/Time Pulse HR Source SpO2 Pt Activity O2 Therapy O2 Del Method O2 Flow Rate BP Pt Position [facility]   08/31/22 1008 84 Monitor 97 % At rest Supplemental oxygen Nasal cannula 3 L/min 132/66 Sitting [facility]   08/31/22 1015 -- -- 87 % Walking None (Room air) -- -- -- -- [facility]   08/31/22 1022 84 Monitor 95 % At rest Supplemental oxygen Nasal cannula 3 L/min 148/66 Sitting [facility]      PT Pain    Date/Time Pain Type Rating: Rest Rating: Activity [facility]   08/31/22 1008 Pain Assessment 0 0 [facility]          Prior Living Environment    Flowsheet Row Most Recent Value   Current Living Arrangements independent living facility   Living Environment Comment pt lives in independent living facility alone, 0STE, elevator access, stall shower with grab bar and shower chair        Prior Level of Function    Flowsheet Row Most Recent Value   Dominant Hand right   Ambulation assistive equipment   Transferring independent   Toileting independent   Bathing independent   Dressing independent   Eating independent   Communication understands/communicates without difficulty   Swallowing swallows foods/liquids without difficulty   Baseline Diet/Method of Nutritional Intake regular, thin liquids   Past History of Dysphagia patient denies prior dysphagia hx   Assistive Device Currently Used at Home walker,  front-wheeled           PT Evaluation and Treatment - 08/31/22 1008        PT Time Calculation    Start Time 1008     Stop Time 1022     Time Calculation (min) 14 min        Session Details    Document Type initial evaluation     Mode of Treatment physical therapy        General Information    Patient Profile Reviewed yes     Existing Precautions/Restrictions fall;oxygen therapy device and L/min        Cognition/Psychosocial    Affect/Mental Status (Cognition) WFL     Orientation Status (Cognition) oriented x 4        Sensory Assessment (Somatosensory)    Sensory Assessment (Somatosensory) LE sensation intact        Range of Motion (ROM)    Range of Motion ROM is WFL        Strength (Manual Muscle Testing)    Strength (Manual Muscle Testing) strength is WFL        Bed Mobility    Comment (Bed Mobility) pt recieved OOB        Sit to Stand Transfer    [place], Sit to Stand Transfer modified independence     Assistive Device walker, front-wheeled        Stand to Sit Transfer    [place], Stand to Sit Transfer modified independence     Assistive Device walker, front-wheeled        Gait Training    [place], Gait modified independence     Assistive Device walker, front-wheeled     Distance in Feet 245 feet     Pattern (Gait) step-through     Deviations/Abnormal Patterns (Gait) gait speed decreased     Comment (Gait/Stairs) slow but steady gait pattern with RW. noted desat to 87% on RA but other wise at baseline        Stairs Training    [place], Stairs not tested     Comment no stairs required        Balance    Static Sitting Balance WFL     Dynamic Sitting Balance WFL     Static Standing Balance WFL     Dynamic Standing Balance WFL     Comment, Balance with RW        AM-PAC (TM) - Mobility (Current Function)    Turning from your back to your side while in a flat bed without using bedrails? 4 - None     Moving from lying on your back to sitting on the side of a flat bed without using bedrails? 4 - None      Moving to and from a bed to a chair? 4 - None     Standing up from a chair using your arms? 4 - None     To walk in a hospital room? 4 - None     Climbing 3-5 steps with a railing? 4 - None     AM-PAC (TM) Mobility Score 24        Assessment/Plan (PT)    Daily Outcome Statement pt is independent and at baseline with all mobility, only limtied by mild desat with mobility. Rec return to independent living facility at d/c     Therapy Frequency evaluation only               PT Assessment/Plan    Flowsheet Row Most Recent Value   PT Recommended Discharge Disposition independent living facility at 08/31/2022 1008   Anticipated Equipment Needs at Discharge (PT) none  [owns RW] at 08/31/2022 1008   Patient/Family Therapy Goals Statement return home at 08/31/2022 1008                    Education Documentation  Home Safety, taught by Craig Gongora, PT at 8/31/2022  2:22 PM.  Learner: Patient  Readiness: Acceptance  Method: Explanation  Response: Verbalizes Understanding  Comment: safety with mobility

## 2022-08-31 NOTE — ASSESSMENT & PLAN NOTE
Hx of CABG and AVR   Previously followed at [place] and has cardiologist at [facility]   On warfarin     - continue warfarin by pharmacy protocol   - on diltiazem 3 times daily for blood pressure and rate control

## 2022-08-31 NOTE — ASSESSMENT & PLAN NOTE
Hgb 10.8 (baseline 9-10s)  No active bleeding  Iron studies - iron + sat low, ferritin low-normal  B12 + folate normal    - Trend with daily CBC  - IV iron 5 days

## 2022-08-31 NOTE — HOSPITAL COURSE
Bonny is a 87 y.o. female admitted on 8/30/2022 with Pneumonia of right lower lobe due to infectious organism [J18.9]. Principal problem is Pneumonia of right lower lobe due to infectious organism.    Past Medical History  Bonny has no past medical history on file.    History of Present Illness   PNA

## 2022-08-31 NOTE — PROGRESS NOTES
Patient:  Bonny Howe  Location:  [place] [address]  MRN:  [id number]  Today's date:  8/31/2022    SPEECH PATHOLOGY EVALUATION:     SLP Diagnosis: Appears w/ grossly functional oral and no clinical s/sx of pharyngeal dysphagia    Recommendations:  1.  Regular diet and thin liquids  2.  Oral medications whole/single with liquid wash  3.  General aspiration precautions  4.  Determine need for further inpatient versus outpatient swallow study.   5. Speech to follow up     Summary/Impressions:  Daily Outcome Statement: Speech evaluation completed; Patient was seated OOB in chair, currently on 3 L O2 via nasal cannula, Sp02 > 95%.  Patient with history of COPD (  2.5 L nasal cannula patient reports both day/night).  Patient has had ongoing shortness of breath, cough, and dyspnea.  CT chest revealing right lower lobe opacification with dependent airspace, clinically correlate with aspiration pneumonia/pneumonitis/atelectasis.  Patient denies dysphagia and/or modified diet.  Does not present with any clinical signs/symptoms of aspiration.  Patient is recommended to continue on regular diet and thin liquids, ongoing concern for silent aspiration could consider swallow.  Patient is fairly cognitively intact, mobile (able to move OOB into chair) was fairly good tolerance of regular/thin diet.  Speech will follow-up for further instrumental assessment as outpatient.    Chief Complaint   Patient presents with    Shortness of Breath     Clinical Course: This 87 y.o. female was admitted 8/30/2022 with Pneumonia of right lower lobe due to infectious organism [J18.9].     Reason for Consult:    Patient was consulted to SLP services for evaluation of oropharyngeal swallow function and to determine patient's least restrictive diet at time of dysphagia evaluation.     Pertinent Radiology Studies:      CT Angiogram Chest 8/30/2022: IMPRESSION:  1.  No evidence of acute pulmonary embolism to the level of the  proximal  segmental branches.  2.  Opacification of multiple airways in the right lower lobe beginning at the  right mainstem bronchus with dependent airspace opacity at the right lower lobe  which could represent aspiration pneumonia/pneumonitis/atelectasis.  3.  Widely  midline sternotomy as in comment.  4.  Severe age-indeterminate compression deformity at T7 and T9 with mild  retropulsion of the inferior vertebral body osseous fragments and mild spinal  canal narrowing at these levels.  5.  See comment for additional findings.       GRBAS: GRBAS was administered.  NO voice impairment appreciated as Quality/Pitch/Intensity appear intact.      No past medical history on file.  No past surgical history on file.    Allergies   Allergen Reactions    Codeine GI intolerance         Results from last 7 days   Lab Units 08/31/22  0451 08/30/22  1313   WBC K/uL 7.77 12.49*   HEMOGLOBIN g/dL 9.8* 10.8*   HEMATOCRIT % 32.3* 34.7*   PLATELETS K/uL 251 265          Baseline Diet/Method of Nutritional Intake: regular, thin liquids  Current Diet: (See below)       Dietary Orders   (From admission, onward)             Start     Ordered    08/30/22 1941  Adult Diet Regular; Cardiac (Low Sodium/Low Fat); RD/LDN may adjust order  Diet effective now        References:    IDDSI Diet reference   Question Answer Comment   Diet Texture Regular    Other Restriction(s): Cardiac (Low Sodium/Low Fat)    Delegation of Authority. Diet orders written by PA/CRNPs may not be adjusted by RD/LDNs. RD/LDN may adjust order        08/30/22 1940                      RN cleared SLP to assess/work with patient as no medical issues identified to preclude this therapy session. Following completion of session, pt remained in chair as they were found with call bell in reach.  Nurse was made aware of patients performance and any changes in status (if applicable).        SLP Vitals    Date/Time Pulse HR Source Resp SpO2 Pt Activity O2 Therapy O2 Del  Method O2 Flow Rate BP BP Location BP Method Pt Position [facility]   08/31/22 1154 82 Monitor 20 98 % At rest Supplemental oxygen Nasal cannula 2.5 L/min 125/58 Right upper arm Automatic Sitting DC      SLP Pain    Date/Time Pain Type Rating: Rest Rating: Activity [facility]   08/31/22 1140 Pain Assessment 0 0 EES          Prior Living Environment    Flowsheet Row Most Recent Value   Current Living Arrangements independent living facility        Prior Level of Function    Flowsheet Row Most Recent Value   Eating independent   Communication understands/communicates without difficulty   Swallowing swallows foods/liquids without difficulty   Baseline Diet/Method of Nutritional Intake regular, thin liquids   Past History of Dysphagia patient denies prior dysphagia hx   Assistive Device Currently Used at Home walker, front-wheeled           SLP Evaluation and Treatment - 08/31/22 1140        SLP Time Calculation    Start Time 1140     Stop Time 1203     Time Calculation (min) 23 min        Session Details    Document Type initial evaluation     Mode of Treatment individual therapy;speech language pathology        General Information    Patient Profile Reviewed yes     Onset of Illness/Injury or Date of Surgery 08/30/22     Referring Physician Dr. Hope     General Observations of Patient Patient awake cooperative, OOB in chair     Existing Precautions/Restrictions aspiration;fall;oxygen therapy device and L/min     Limitations/Impairments swallowing        Cognition/Psychosocial    Affect/Mental Status (Cognition) WFL     Follows Commands (Cognition) follows multi-step commands        Dentition (Oral Motor)    Dentition (Oral Motor) natural dentition        Facial Symmetry (Oral Motor)    Facial Symmetry (Oral Motor) WNL        Lip Function (Oral Motor)    Lip Range of Motion (Oral Motor) WNL        Tongue Function (Oral Motor)    Tongue ROM (Oral Motor) WNL        Jaw Function (Oral Motor)    Jaw Function (Oral Motor) WNL         Cough/Swallow/Gag Reflex (Oral Motor)    Soft Palate/Velum (Oral Motor) WNL     Volitional Throat Clear/Cough (Oral Motor) WNL     Volitional Swallow (Oral Motor) WNL        Vocal Quality/Secretion Management (Oral Motor)    Vocal Quality (Oral Motor) WFL     Secretion Management (Oral Motor) WNL        Motor Speech    Comment, Motor Speech Assessment Pt. is 100% intelligible in conversational speech        Functional Communication Measures    FCM: Swallowing 7-->Level 7        General Swallowing Observations    Current Diet/Method of Nutritional Intake regular;thin liquids     Signs/Symptoms of Aspiration (Current Diet) right lower lobe infiltrates     Respiratory Support (General Swallowing Observations) nasal cannula     Comment, Secretions/Suctioning unremarkable     Comment, General Swallowing Observations COPD, c/f aspiration PNA        Food and Liquid Trials (NIS)    Patient Positioning upright in chair     Oral Intake/Feeding Performance independent/appropriate self-feeding skills     Liquid Consistencies Evaluated thin liquids     Thin Liquids intact;straw sips     Comment, Thin Liquids no overt s/sx     Food Consistencies Evaluated pureed (PU4);regular     Pureed (PU4) intact     Regular intact     Oral Preparatory Phase of Swallow WFL     Oral Phase of Swallow WFL     Pharyngeal Phase of Swallow no clinical symptoms     Esophageal Phase of Swallow no clinical symptoms     SpO2 Level >96% was on 3 L 02, asking to be weaned down to 2.5 (which is her baseline), spoke with nursing who titrated 02 down to 2.5 L        Swallowing Recommendations    Diet Consistency Recommendations regular;thin liquids     Medication Administration whole with liquid     Instrumental Assessment Recommendations reassess via non-instrumental clinical swallow evaluation     Comment, Swallowing Recommendations General aspiration precautions        Swallowing Intervention    Dysphagia/Swallowing Interventions monitor tolerance  of;current diet without evidence of aspiration;advanced diet/liquid texture trials        Coping    Observed Emotional State calm;cooperative     Verbalized Emotional State acceptance     Trust Relationship/Rapport care explained        AM-PAC (TM) - Cognition (Current Function)    Following/understanding a 10-15 minute speech or presentation? 4 - None     Understanding familiar people during ordinary conversations? 4 - None     Remembering to take medications at the appropriate time? 4 - None     Remembering where things were placed or put away? 4 - None     Remembering a list of 3 or 4 errands without writing it down? 4 - None     Taking care of complicated tasks? 4 - None     AM-PAC (TM) Cognition Score 24        SLP Goals    Swallowing Goal Selection oral nutrition/hydration, SLP goal (free text)        Assessment/Plan (SLP)    Daily Outcome Statement Speech evaluation completed; Patient was seated OOB in chair, currently on 3 L O2 via nasal cannula, Sp02 > 95%.  Patient with history of COPD (  2.5 L nasal cannula patient reports both day/night).  Patient has had ongoing shortness of breath, cough, and dyspnea.  CT chest revealing right lower lobe opacification with dependent airspace, clinically correlate with aspiration pneumonia/pneumonitis/atelectasis.  Patient denies dysphagia and/or modified diet.  Does not present with any clinical signs/symptoms of aspiration.  Patient is recommended to continue on regular diet and thin liquids, ongoing concern for silent aspiration could consider swallow.  Patient is fairly cognitively intact, mobile (able to move OOB into chair) was fairly good tolerance of regular/thin diet.  Speech will follow-up for further instrumental assessment as outpatient.     Rehab Potential good, to achieve stated therapy goals     Therapy Frequency 1-2 times/wk     Planned Therapy Interventions dysphagia therapy               SLP Assessment/Plan    Flowsheet Row Most Recent Value   SLP  Diagnosis Appears w/ grossly functional oral and no clinical s/sx of pharyngeal dysphagia at 08/31/2022 1140   Patient/Family Therapy Goal Statement I want my lunch at 08/31/2022 1140               Education Documentation  Diet Modification, taught by Marcelina Brush CCC-SLP at 8/31/2022 12:24 PM.  Learner: Patient  Readiness: Acceptance  Method: Explanation  Response: Verbalizes Understanding  Comment: re: regular/thin          SLP Goals    Flowsheet Row Most Recent Value   Oral Nutrition/Hydration Goal    Oral Nutrition/Hydration Goal Determine need for further instrumental assessment at 08/31/2022 1140   Time Frame by discharge at 08/31/2022 1140   Strategies/Barriers dysphagia, RLL pnuemonia, at 08/31/2022 1140

## 2022-08-31 NOTE — ASSESSMENT & PLAN NOTE
Patient with worsening shortness of breath over the last week, increased phlegm on background hx of COPD   Covid negative; lactate wnl    CXR R basilar opacity   CTPE: no evidence of PE to prox seg branches, RLL opacification PNA/pneumonitis/atelectasis, midline sternotomy, compression deformity T7/T9 with mild retropulsion  SLP no aspiration risk    - Continue CTX and azithromycin for 5 days   - Trend CBC   - Trend fever curve  - Hold off on steroids   - Incentive spirometry given restrictive lung physiology   - f/u sputum and blood culture (NGTD), urine legionella   - Continue torsemide - monitor for worsening renal function   - Strict I/O, daily standing weights

## 2022-08-31 NOTE — STUDENT
Internal Medicine  History & Physical        CHIEF COMPLAINT   Shortness of breath      HISTORY OF PRESENT ILLNESS      This is a 87 y.o. female with a past medical history of COPD and CHF who presents with difficulty breathing x 2 weeks.  Pt reports that she was just d/c from [facility] on Mon 8/29 where she was seen for SOB and no cause was found. She was treated with diuresis but did not improve. Yesterday, the pt was seen by her family members who said she looked worse and advised her to go to the ED.    Pt presented to the ED with above symptoms and received workup to include CTA chest (negative for PE) and CXR, indicating RLL pneumonia. She was treated with ceftriaxone + azithromycin in the ED and was admitted to the medicine service.    Upon exam, pt endorses SOB x 2 weeks and a cough productive of white mucus. She denies any n/v/d/c, chest pain, or other pain.    I spoke to the pt's son and daughter by phone to provide an update on the pt's hospital course. All questions answered and they displayed understanding of the circumstances.    PAST MEDICAL AND SURGICAL HISTORY      PMHx:  COPD, HF, mechanical AVR x 20 years, Vitamin D deficiency    PSHx:  Cholecystectomy, open AVR, CABG    PCP:   Parker Rodriguez MD (Inactive)    MEDICATIONS      Prior to Admission medications    Medication Sig Start Date End Date Taking? Authorizing Provider   acetaminophen (TYLENOL) 325 mg tablet Take 3 tablets (975 mg total) by mouth every 6 (six) hours. 9/3/18  Yes Marita Roberts CRNP   albuterol HFA (VENTOLIN HFA) 90 mcg/actuation inhaler Inhale 2 puffs every 6 (six) hours as needed for wheezing.   Yes Kingsley Houston MD   diltiazem LA (CARDIZEM LA) 120 mg 24 hr tablet Take 120 mg by mouth 3 (three) times a day.   Yes Kingsley Houston MD   fluticasone-umeclidinium-vilanterol (TRELEGY ELLIPTA) 100-62.5-25 mcg blister with device powder for inhalation Inhale 1 puff daily.   Yes Kingsley Houston MD    HYDROmorphone (DILAUDID) 2 mg tablet Take 2-4 mg by mouth every 4 (four) hours as needed for moderate pain or severe pain.   Yes Kingsley Houston MD   ipratropium (ATROVENT) 0.02 % nebulizer solution Take 500 mcg by nebulization 4 (four) times a day as needed for wheezing or shortness of breath.   Yes Kingsley Houston MD   LORazepam (ATIVAN) 0.5 mg tablet Take 0.5 mg by mouth every 6 (six) hours as needed for anxiety.   Yes Kingsley Houston MD   meclizine (ANTIVERT) 12.5 mg tablet Take 12.5 mg by mouth 3 (three) times a day as needed for dizziness.   Yes Kingsley Houston MD   predniSONE (DELTASONE) 5 mg tablet Take 5 mg by mouth daily.   Yes Kingsley Houston MD   rosuvastatin (CRESTOR) 40 mg tablet Take 40 mg by mouth nightly.   Yes Kingsley Houston MD   senna (SENOKOT) 8.6 mg tablet Take 1 tablet by mouth daily.   Yes Kingsley Houston MD   torsemide (DEMADEX) 5 mg tablet Take 10 mg by mouth 2 (two) times a day.   Yes Kingsley Houston MD   warfarin (COUMADIN) 2.5 mg tablet Take 2.5 mg by mouth every evening.   Yes Kingsley Houston MD   zolpidem (AMBIEN) 10 mg tablet Take 10 mg by mouth nightly as needed (sleep, in addition to 5 mg).   Yes Kingsley Houston MD   zolpidem (AMBIEN) 5 mg tablet Take 5 mg by mouth nightly.   Yes Kingsley Houston MD       Home medications were personally reviewed.    ALLERGIES      Codeine, also penicillin per pt and family (causes hives and GI upset)     FAMILY HISTORY      No family history on file.    SOCIAL HISTORY      Social History     Socioeconomic History    Marital status:    Tobacco Use    Smoking status: Former Smoker     Types: Cigarettes    Smokeless tobacco: Former User    Tobacco comment: quit 40 years ago   Substance and Sexual Activity    Alcohol use: Yes     Alcohol/week: 2.0 standard drinks     Types: 2 Standard drinks or equivalent per week    Drug use: No     Social Determinants of Health     Food  Insecurity: No Food Insecurity    Worried About Running Out of Food in the Last Year: Never true    Ran Out of Food in the Last Year: Never true       REVIEW OF SYSTEMS      General: denies fatigue, recent falls.  Skin: denies rashes.  Head: denies headache.  Eyes: denies change in vision, flashes, discharge.  Ears: denies change in hearing, tinnitus, discharge.  Nose: denies loss of smell, discharge.  Throat: denies dysphagia, pain.  Neck: denies pain, stiffness.  Breasts: denies pain, discharge.  Respiratory: + cough productive of white mucus, denies hemoptysis.  CV: + dyspnea, chest pain.  GI: denies nausea/vomiting/diarrhea/constipation, pain.  Urinary: denies hematuria, dysuria.  Peripheral vascular: denies claudication.  Musculoskeletal: denies weakness, difficulty ambulating.  Neurologic: denies dizziness, clumsiness.  Hematologic: denies easy bruising, bleeding.  Endocrine: denies hot/cold flashes.  Psychiatric: denies suicidal/homicidal ideation.      PHYSICAL EXAMINATION      Temp:  [36.6 °C (97.8 °F)-37 °C (98.6 °F)] 36.6 °C (97.8 °F)  Heart Rate:  [] 88  Resp:  [20-33] 20  BP: (117-154)/(58-93) 117/83  Body mass index is 23.69 kg/m².    Physical Exam  VS on exam: HR 90 SpO2 97% on 4 L, 142/65. ECG NSR with regular PACs.  General Survey: Pt is alert. Able to maintain natural conversation and eye contact with appropriate responses and questioning.  Vital signs: as documented above.  Skin: no jaundice, visible rashes.  Head: normocephalic, atraumatic.  Eyes: pupils equal and round, reactive to light @ 4 mm. EOMs intact.  Ears: able to understand conversation at slightly elevated volume. Uses hearing aids. Atraumatic, no external discharge.  Nose: no external discharge.  Throat: no swelling or erythema.  Neck: trachea midline, no bruits bilaterally, no stridor.  Lymph nodes: no enlarged lymph nodes noted.  Thorax and Lungs: + slightly decreased LS RLL, + slight expiratory wheeze,  Chest rise equal  with no crepitus.  Cardiovascular: + S1 + S2 with mechanical click - S3 - S4. No murmurs, gallops, or rubs noted to auscultation.  Breasts: deferred.  Abdomen: no pain to palpation x 4 quadrants. Bowel sounds auscultated in lower right quadrant.  Genitalia: deferred.  Rectal: deferred.  Peripheral vascular: no edema.  Musculoskeletal: strength equal in all 4 limbs.  Neurologic: awake, alert, and oriented x4 to person, place, time, and event. Sensation to light touch and pressure preserved x4 limbs.      LABS / IMAGING / EKG        Labs:  I have reviewed the patient's pertinent labs.  Significant abnormals are INR 2.2 (therapeutic), Corrected calcium 9.6, Hgb 9.8.    Microbiology Data personally reviewed:  Microbiology Results     Procedure Component Value Units Date/Time    SARS-CoV-2 (COVID-19), PCR Nasopharynx [[id number]]  (Normal) Collected: 08/30/22 1322    Specimen: Nasopharyngeal Swab from Nasopharynx Updated: 08/30/22 1420    Narrative:      The following orders were created for panel order SARS-CoV-2 (COVID-19), PCR Nasopharynx.  Procedure                               Abnormality         Status                     ---------                               -----------         ------                     SARS-COV-2 (COVID-19)/ F...[[id number]]  Normal              Final result                 Please view results for these tests on the individual orders.    SARS-COV-2 (COVID-19)/ FLU A/B, AND RSV, PCR Nasopharynx [[id number]]  (Normal) Collected: 08/30/22 1322    Specimen: Nasopharyngeal Swab from Nasopharynx Updated: 08/30/22 1420     SARS-CoV-2 (COVID-19) Negative     Influenza A Negative     Influenza B Negative     Respiratory Syncytial Virus Negative    Narrative:      Testing performed using real-time PCR for detection of COVID-19. EUA approved validation studies performed on site.           Imaging personally reviewed(does not include unread studies):  CT ANGIOGRAPHY CHEST PULMONARY EMBOLISM WITH IV  CONTRAST    Result Date: 8/30/2022  IMPRESSION: 1.  No evidence of acute pulmonary embolism to the level of the proximal segmental branches. 2.  Opacification of multiple airways in the right lower lobe beginning at the right mainstem bronchus with dependent airspace opacity at the right lower lobe which could represent aspiration pneumonia/pneumonitis/atelectasis. 3.  Widely  midline sternotomy as in comment. 4.  Severe age-indeterminate compression deformity at T7 and T9 with mild retropulsion of the inferior vertebral body osseous fragments and mild spinal canal narrowing at these levels. 5.  See comment for additional findings.     X-RAY CHEST 1 VIEW    Result Date: 8/30/2022  IMPRESSION: Right basilar opacity.      ECG/Telemetry  I have independently reviewed the ECG. No significant findings.    ASSESSMENT AND PLAN         Pneumonia:  Acute, unchanged  Will continue treatment with ceftriaxone and azithromycin. Possible HAP given her recent stay at [facility], pending sputum cultures. Will wean O2 as tolerated since pt does not use O2 during the day. Ordered Speech therapy for dysphagia screen.    COPD:  Chronic, unchanged.  Continue LABA+LAMA+ICS to manage.    HF:  Chronic, improved.  No signs of edema or fluid retention due to recent diuresis at hospital stay. Will monitor dry weights.    AVR:  Chronic, unchanged.  Will maintain therapeutic INR of 2.0-3.0.     HTN:  Chronic, unchanged.  Maintain diltiazem.    Anemia:  Unknown  Iron studies indicate possible iron-deficiency. Will replete iron IV x 5 days and monitor tomorrow.    CKD stage 3a  Chronic, unchanged.  eGFR and Cr at baseline. Will monitor and avoid unnecessary nephrotoxic medications.          Anemia  Assessment & Plan  Hgb 10.8 (baseline 9-10s)  No active bleeding    - Trend with daily CBC  - iron studies, ferritin, folate, b12    Vitamin D deficiency  Assessment & Plan  Hx of vitamin D deficiency and osteoporosis   Follows at [facility]  with Severe age-indeterminate compression deformity at T7 and T9 with mild   retropulsion of the inferior vertebral body osseous fragments and mild spinal   canal narrowing at these levels.     - outpatient follow up for osteoporosis   - follow up vitamin D level  - dilaudid PRN home dose for pain       H/O aortic valve replacement  Assessment & Plan  Hx of CABG and AVR   Previously followed at [place] and has cardiologist at [facility]   On warfarin     - continue warfarin by pharmacy protocol   - on diltiazem 3 times daily for blood pressure and rate control     COPD (chronic obstructive pulmonary disease) (CMS/[facility])  Assessment & Plan  Patient with history of recurrent COPD exacerbations and restrictive lung disease.  She is on trelegy   On home oxygen 2.5 L nightly however has been taking continuously last 2 weeks per her pulm request   Last COPD exacerbation requiring steroids 7/2022  Follows with pulm at [place]   Unable to bring her Trelegy     Non wheezy on exam. Do not suspect an acute COPD exacerbation     - Maintenance inhaler- Spiriva and Dulera  - Duoneb q6 PRN   - vbg to assess CO2      Anxiety disorder, unspecified  Assessment & Plan  Continue zolpidem nightly   PRN ativan     * Pneumonia of right lower lobe due to infectious organism  Assessment & Plan  Patient with worsening shortness of breath over the last week, increased phlegm on background hx of COPD   Covid negative; lactate wnl    CXR R basilar opacity   CTPE: no evidence of PE to prox seg branches, RLL opacification PNA/pneumonitis/atelectasis, midline sternotomy, compression deformity T7/T9 with mild retropulsion    - Continue CTX and azithromycin for 5 days   - Trend CBC   - Trend fever curve  - Hold off on steroids   - Incentive spirometry given restrictive lung physiology   - f/u sputum and blood culture, MRSA nares, urine legionella   - unclear if underlying heart failure given patient on torsemide. Continue torsemide - monitor for  worsening renal function   - Strict I/O, daily standing weights             VTE Assessment: [place]    VTE Prophylaxis: Current anticoagulants:  warfarin (COUMADIN) therapy by pharmacy protocol, Other, evaluate daily      Code Status: Limited Code  Estimated discharge date: 9/2/2022     ATTENDING DOCUMENTATION  ALSO SEE ATTENDING ATTESTATION SECTION OF NOTE

## 2022-08-31 NOTE — PROGRESS NOTES
Warfarin Dosing by Pharmacy Consult Follow up    Bonny Howe is a 87 y.o. female who has been consulted for warfarin dosing for Valve replacement-bioprosthetic.    Reviewed relevant clinical data including weight, previous warfarin doses, CBC and PT/INR levels:  PT   Date/Time Value Ref Range Status   08/31/2022 0451 23.8 (H) 12.2 - 14.5 sec Final   08/30/2022 2129 22.2 (H) 12.2 - 14.5 sec Final   08/30/2022 1322 20.7 (H) 12.2 - 14.5 sec Final     INR   Date/Time Value Ref Range Status   08/31/2022 0451 2.2   Final     Comment:     Moderate Intensity Anticoagulation = 2.0 to 3.0, High Intensity = 2.5 to 3.5   08/30/2022 2129 2.0   Final     Comment:     Moderate Intensity Anticoagulation = 2.0 to 3.0, High Intensity = 2.5 to 3.5   08/30/2022 1322 1.8   Final     Comment:     Moderate Intensity Anticoagulation = 2.0 to 3.0, High Intensity = 2.5 to 3.5       CBC Results         08/31/22 08/30/22 01/17/22     0451 1313 1040    WBC 7.77 12.49 7.45    RBC 3.93 4.28 4.04    HGB 9.8 10.8 10.6    HCT 32.3 34.7 34.9    MCV 82.2 81.1 86.4    MCH 24.9 25.2 26.2    MCHC 30.3 31.1 30.4     265 279               Warfarin Administrations (last 96 hours)       Date/Time Action Medication Dose    08/30/22 2137 Given    warfarin (COUMADIN) tablet 2.5 mg 2.5 mg            Assessment/Plan  The patient is ordered warfarin dosing by pharmacy.      Pertinent medication interactions include: ceftriaxone    Warfarin INR level 2.2 after dose of 2.5 mg PO x1.  INR goal: 2 - 3    Will continue warfarin 2.5 mg PO x1.    Next INR:  9/1/22 @ 06:00    Patient is not receiving concurrent parenteral anticoagulation for bridging purposes.    Pharmacy will continue to follow the patient's warfarin dosing daily during this course of therapy.    Please call INR levels to the pharmacy.  Jakub Babin PharmD

## 2022-08-31 NOTE — PLAN OF CARE
Problem: Adult Inpatient Plan of Care  Goal: Readiness for Transition of Care  Intervention: Mutually Develop Transition Plan  Flowsheets (Taken 8/31/2022 1136)  Anticipated Discharge Disposition: home with home health  Discharge Coordination/Progress: Met with patient to discuss discharge plans. Patient reports that she moved from her daughter's home in March to an independent living facility ([facility]). She lives on the first floor in a one level apartment. She ambulates with a walker and uses the facility's dining room to eat meals. Patient states that she is questioning whether or not she might need more medical assistance because she has not felt well for some time. Her daughter and son are able to take her to doctor's appointments and assist when necessary. Patient states she uses oxygen at night (does not remember the name of medical equipment company). She has a life alert device. Has been vaccinated for COVID and follows with pulmonologist from [facility]. Patient has not had home health care in the past. She is interested in home health services upon discharge secondary to her debilitated health condition. Although, she is stil able to ambulate, she is very concerned with her health status at this time. Gave patient choice of home care agencies. She chose [facility]. Referral made to Antonia García RN, liaison. CM to continue to follow until discharged.  Assistive Device/Animal Currently Used at Home: walker, front-wheeled  Anticipated Changes Related to Illness: (more unsure of herself in terms of managing independently) other (see comments)  Concerns Comments:   Patient admitted with hypoxia in the 80's requiring oxygen per nasal cannula. PMH includers mechanical AVR on coumadin, COPD (nighttime O2)   CAD s/p CABG   Vit D deficiency, and HTN.  DX RLL Pna.  Current Discharge Risk:   chronically ill   lives alone  Readmission Within the Last 30 Days: no previous  admission in last 30 days  Patient/Family Anticipated Services at Transition:   home health care   skilled nursing  Patient/Family Anticipates Transition to:   home with help/services   home  Transportation Anticipated: family or friend will provide  Concerns to be Addressed:   care coordination/care conferences   discharge planning   home safety  Patient's Choice of Community Agency(s): [facility]  Offered/Gave Vendor List: yes  Type of Home Care Services:   nursing   home PT   home health aide  Type of Skilled Nursing Care Services:   PT   nursing

## 2022-08-31 NOTE — PROGRESS NOTES
Warfarin Dosing by Pharmacy Consult Initiated    Bonny Howe is a 87 y.o. female who has been consulted for warfarin dosing for Valve replacement-bioprosthetic  prescribed by Isela Nye    Reviewed relevant clinical data including weight, previous warfarin doses, CBC and PT/INR levels:  PT   Date/Time Value Ref Range Status   08/30/2022 1322 20.7 (H) 12.2 - 14.5 sec Final     INR   Date/Time Value Ref Range Status   08/30/2022 1322 1.8   Final     Comment:     Moderate Intensity Anticoagulation = 2.0 to 3.0, High Intensity = 2.5 to 3.5       CBC Results         08/30/22 01/17/22 01/13/22     1313 1040 0731    WBC 12.49 7.45 8.31    RBC 4.28 4.04 3.72    HGB 10.8 10.6 9.8    HCT 34.7 34.9 31.8    MCV 81.1 86.4 85.5    MCH 25.2 26.2 26.3    MCHC 31.1 30.4 30.8     279 273               Warfarin Administrations (last 96 hours)       None            Assessment/Plan  The patient is ordered warfarin dosing by pharmacy.      Patient does satisfy criteria for increased warfarin sensitivity.    Pertinent medication interactions include:none     Patient is not receiving concurrent parenteral anticoagulation for bridging purposes.    Will initiate warfarin dose of 2.5  mg PO x1. Physician ordered.    Target INR goal of 2 - 3 per warfarin dosing per pharmacy order.  Pharmacy will continue to follow the patient's warfarin dosing daily.    Please call INR levels to the pharmacy.  Sandra Avalos [facility]

## 2022-09-01 LAB
ANION GAP SERPL CALC-SCNC: 6 MEQ/L (ref 3–15)
ATRIAL RATE: 78
BASOPHILS # BLD: 0.03 K/UL (ref 0.01–0.1)
BASOPHILS NFR BLD: 0.4 %
BUN SERPL-MCNC: 17 MG/DL (ref 8–20)
CALCIUM SERPL-MCNC: 8.4 MG/DL (ref 8.9–10.3)
CHLORIDE SERPL-SCNC: 97 MEQ/L (ref 98–109)
CO2 SERPL-SCNC: 34 MEQ/L (ref 22–32)
CREAT SERPL-MCNC: 1 MG/DL (ref 0.6–1.1)
DIFFERENTIAL METHOD BLD: ABNORMAL
EOSINOPHIL # BLD: 0.23 K/UL (ref 0.04–0.36)
EOSINOPHIL NFR BLD: 2.9 %
ERYTHROCYTE [DISTWIDTH] IN BLOOD BY AUTOMATED COUNT: 16.2 % (ref 11.7–14.4)
GFR SERPL CREATININE-BSD FRML MDRD: 52.6 ML/MIN/1.73M*2
GLUCOSE SERPL-MCNC: 90 MG/DL (ref 70–99)
HCT VFR BLDCO AUTO: 30.4 % (ref 35–45)
HGB BLD-MCNC: 9.4 G/DL (ref 11.8–15.7)
IMM GRANULOCYTES # BLD AUTO: 0.04 K/UL (ref 0–0.08)
IMM GRANULOCYTES NFR BLD AUTO: 0.5 %
INR PPP: 2.2
LYMPHOCYTES # BLD: 1.52 K/UL (ref 1.2–3.5)
LYMPHOCYTES NFR BLD: 19.4 %
MAGNESIUM SERPL-MCNC: 2.2 MG/DL (ref 1.8–2.5)
MCH RBC QN AUTO: 25.4 PG (ref 28–33.2)
MCHC RBC AUTO-ENTMCNC: 30.9 G/DL (ref 32.2–35.5)
MCV RBC AUTO: 82.2 FL (ref 83–98)
MONOCYTES # BLD: 0.76 K/UL (ref 0.28–0.8)
MONOCYTES NFR BLD: 9.7 %
NEUTROPHILS # BLD: 5.26 K/UL (ref 1.7–7)
NEUTS SEG NFR BLD: 67.1 %
NRBC BLD-RTO: 0 %
P AXIS: 60
PDW BLD AUTO: 9.9 FL (ref 9.4–12.3)
PLATELET # BLD AUTO: 244 K/UL (ref 150–369)
POTASSIUM SERPL-SCNC: 3.5 MEQ/L (ref 3.6–5.1)
PR INTERVAL: 168
PROTHROMBIN TIME: 24.2 SEC (ref 12.2–14.5)
QRS DURATION: 108
QT INTERVAL: 406
QTC CALCULATION(BAZETT): 462
R AXIS: -40
RBC # BLD AUTO: 3.7 M/UL (ref 3.93–5.22)
SODIUM SERPL-SCNC: 137 MEQ/L (ref 136–144)
T WAVE AXIS: 68
VENTRICULAR RATE: 78
WBC # BLD AUTO: 7.84 K/UL (ref 3.8–10.5)

## 2022-09-01 PROCEDURE — 63700000 HC SELF-ADMINISTRABLE DRUG: Performed by: HOSPITALIST

## 2022-09-01 PROCEDURE — 25800000 HC PHARMACY IV SOLUTIONS

## 2022-09-01 PROCEDURE — 25000000 HC PHARMACY GENERAL: Performed by: STUDENT IN AN ORGANIZED HEALTH CARE EDUCATION/TRAINING PROGRAM

## 2022-09-01 PROCEDURE — 85025 COMPLETE CBC W/AUTO DIFF WBC: CPT

## 2022-09-01 PROCEDURE — 63600000 HC DRUGS/DETAIL CODE

## 2022-09-01 PROCEDURE — 21400000 HC ROOM AND CARE CCU/INTERMEDIATE

## 2022-09-01 PROCEDURE — 82310 ASSAY OF CALCIUM: CPT

## 2022-09-01 PROCEDURE — 97535 SELF CARE MNGMENT TRAINING: CPT | Mod: GO

## 2022-09-01 PROCEDURE — 63700000 HC SELF-ADMINISTRABLE DRUG: Performed by: STUDENT IN AN ORGANIZED HEALTH CARE EDUCATION/TRAINING PROGRAM

## 2022-09-01 PROCEDURE — 97166 OT EVAL MOD COMPLEX 45 MIN: CPT | Mod: GO

## 2022-09-01 PROCEDURE — 99233 SBSQ HOSP IP/OBS HIGH 50: CPT | Performed by: INTERNAL MEDICINE

## 2022-09-01 PROCEDURE — 93010 ELECTROCARDIOGRAM REPORT: CPT | Performed by: INTERNAL MEDICINE

## 2022-09-01 PROCEDURE — 93005 ELECTROCARDIOGRAM TRACING: CPT

## 2022-09-01 PROCEDURE — 63700000 HC SELF-ADMINISTRABLE DRUG

## 2022-09-01 PROCEDURE — 83735 ASSAY OF MAGNESIUM: CPT

## 2022-09-01 PROCEDURE — 36415 COLL VENOUS BLD VENIPUNCTURE: CPT

## 2022-09-01 PROCEDURE — 25000000 HC PHARMACY GENERAL

## 2022-09-01 PROCEDURE — 85610 PROTHROMBIN TIME: CPT

## 2022-09-01 RX ORDER — WARFARIN 2.5 MG/1
2.5 TABLET ORAL ONCE
Status: COMPLETED | OUTPATIENT
Start: 2022-09-01 | End: 2022-09-01

## 2022-09-01 RX ORDER — ONDANSETRON 4 MG/1
4 TABLET, ORALLY DISINTEGRATING ORAL EVERY 8 HOURS PRN
Status: DISCONTINUED | OUTPATIENT
Start: 2022-09-01 | End: 2022-09-02 | Stop reason: HOSPADM

## 2022-09-01 RX ORDER — ONDANSETRON 4 MG/1
4 TABLET, ORALLY DISINTEGRATING ORAL ONCE
Status: COMPLETED | OUTPATIENT
Start: 2022-09-01 | End: 2022-09-01

## 2022-09-01 RX ORDER — POTASSIUM CHLORIDE 14.9 MG/ML
20 INJECTION INTRAVENOUS ONCE
Status: COMPLETED | OUTPATIENT
Start: 2022-09-01 | End: 2022-09-01

## 2022-09-01 RX ORDER — BISACODYL 10 MG/1
10 SUPPOSITORY RECTAL DAILY PRN
Status: DISCONTINUED | OUTPATIENT
Start: 2022-09-01 | End: 2022-09-02 | Stop reason: HOSPADM

## 2022-09-01 RX ORDER — POLYETHYLENE GLYCOL 3350 17 G/17G
17 POWDER, FOR SOLUTION ORAL 2 TIMES DAILY
Status: DISCONTINUED | OUTPATIENT
Start: 2022-09-01 | End: 2022-09-02 | Stop reason: HOSPADM

## 2022-09-01 RX ORDER — ADHESIVE BANDAGE
30 BANDAGE TOPICAL DAILY
Status: DISCONTINUED | OUTPATIENT
Start: 2022-09-01 | End: 2022-09-02 | Stop reason: HOSPADM

## 2022-09-01 RX ADMIN — TORSEMIDE 10 MG: 10 TABLET ORAL at 21:13

## 2022-09-01 RX ADMIN — MOMETASONE FUROATE AND FORMOTEROL FUMARATE DIHYDRATE 2 PUFF: 200; 5 AEROSOL RESPIRATORY (INHALATION) at 18:44

## 2022-09-01 RX ADMIN — POTASSIUM CHLORIDE 20 MEQ: 14.9 INJECTION, SOLUTION INTRAVENOUS at 14:53

## 2022-09-01 RX ADMIN — SODIUM CHLORIDE 125 MG: 9 INJECTION, SOLUTION INTRAVENOUS at 08:28

## 2022-09-01 RX ADMIN — ROSUVASTATIN CALCIUM 10 MG: 10 TABLET, FILM COATED ORAL at 21:13

## 2022-09-01 RX ADMIN — SENNOSIDES 1 TABLET: 8.6 TABLET, FILM COATED ORAL at 21:13

## 2022-09-01 RX ADMIN — DILTIAZEM HYDROCHLORIDE 120 MG: 120 CAPSULE, COATED, EXTENDED RELEASE ORAL at 08:11

## 2022-09-01 RX ADMIN — ACETAMINOPHEN 975 MG: 325 TABLET ORAL at 11:25

## 2022-09-01 RX ADMIN — CEFTRIAXONE SODIUM 1 G: 1 INJECTION, POWDER, FOR SOLUTION INTRAVENOUS at 08:08

## 2022-09-01 RX ADMIN — TIOTROPIUM BROMIDE INHALATION SPRAY 2 PUFF: 3.12 SPRAY, METERED RESPIRATORY (INHALATION) at 08:13

## 2022-09-01 RX ADMIN — IPRATROPIUM BROMIDE AND ALBUTEROL SULFATE 3 ML: .5; 3 SOLUTION RESPIRATORY (INHALATION) at 06:36

## 2022-09-01 RX ADMIN — ONDANSETRON 4 MG: 4 TABLET, ORALLY DISINTEGRATING ORAL at 08:25

## 2022-09-01 RX ADMIN — DILTIAZEM HYDROCHLORIDE 120 MG: 120 CAPSULE, COATED, EXTENDED RELEASE ORAL at 21:13

## 2022-09-01 RX ADMIN — ACETAMINOPHEN 975 MG: 325 TABLET ORAL at 06:36

## 2022-09-01 RX ADMIN — PREDNISONE 5 MG: 5 TABLET ORAL at 08:11

## 2022-09-01 RX ADMIN — IPRATROPIUM BROMIDE AND ALBUTEROL SULFATE 3 ML: .5; 3 SOLUTION RESPIRATORY (INHALATION) at 17:23

## 2022-09-01 RX ADMIN — TORSEMIDE 10 MG: 10 TABLET ORAL at 08:11

## 2022-09-01 RX ADMIN — POTASSIUM CHLORIDE 20 MEQ: 14.9 INJECTION, SOLUTION INTRAVENOUS at 11:19

## 2022-09-01 RX ADMIN — IPRATROPIUM BROMIDE AND ALBUTEROL SULFATE 3 ML: .5; 3 SOLUTION RESPIRATORY (INHALATION) at 11:26

## 2022-09-01 RX ADMIN — WARFARIN SODIUM 2.5 MG: 2.5 TABLET ORAL at 17:23

## 2022-09-01 RX ADMIN — POLYETHYLENE GLYCOL 3350 17 G: 17 POWDER, FOR SOLUTION ORAL at 08:12

## 2022-09-01 RX ADMIN — LORAZEPAM 0.5 MG: 0.5 TABLET ORAL at 17:24

## 2022-09-01 RX ADMIN — AZITHROMYCIN MONOHYDRATE 500 MG: 500 INJECTION, POWDER, LYOPHILIZED, FOR SOLUTION INTRAVENOUS at 16:30

## 2022-09-01 RX ADMIN — POLYETHYLENE GLYCOL 3350 17 G: 17 POWDER, FOR SOLUTION ORAL at 21:13

## 2022-09-01 RX ADMIN — HYDROMORPHONE HYDROCHLORIDE 2 MG: 2 TABLET ORAL at 21:13

## 2022-09-01 RX ADMIN — GUAIFENESIN 600 MG: 600 TABLET ORAL at 21:13

## 2022-09-01 RX ADMIN — GUAIFENESIN 600 MG: 600 TABLET ORAL at 08:12

## 2022-09-01 RX ADMIN — ACETAMINOPHEN 975 MG: 325 TABLET ORAL at 17:22

## 2022-09-01 RX ADMIN — MAGNESIUM HYDROXIDE 30 ML: 400 SUSPENSION ORAL at 11:26

## 2022-09-01 RX ADMIN — DILTIAZEM HYDROCHLORIDE 120 MG: 120 CAPSULE, COATED, EXTENDED RELEASE ORAL at 14:55

## 2022-09-01 RX ADMIN — ZOLPIDEM TARTRATE 5 MG: 5 TABLET ORAL at 21:13

## 2022-09-01 ASSESSMENT — COGNITIVE AND FUNCTIONAL STATUS - GENERAL
DRESSING REGULAR UPPER BODY CLOTHING: 3 - A LITTLE
HELP NEEDED FOR BATHING: 3 - A LITTLE
AFFECT: WFL;ANXIOUS
DRESSING REGULAR LOWER BODY CLOTHING: 3 - A LITTLE
TOILETING: 3 - A LITTLE
EATING MEALS: 4 - NONE
HELP NEEDED FOR PERSONAL GROOMING: 4 - NONE

## 2022-09-01 NOTE — PLAN OF CARE
Plan of Care Review  Plan of Care Reviewed With: patient  Progress: improving  Outcome Summary: Patient admitted with Pneumonia of right lower lobe due to infectious organism and she continues on IV Rocephin/Azithromycin. Continued DALE/SOB with inyermittent wheezing and she continues on Neb Treatments. OOB to chair and ambuloation uncouraged. O2 wean to 1 liter and she's satting between 93-94% at rest.

## 2022-09-01 NOTE — PLAN OF CARE
Problem: Adult Inpatient Plan of Care  Goal: Plan of Care Review  Outcome: Progressing  Flowsheets (Taken 9/1/2022 0333)  Progress: improving  Plan of Care Reviewed With: patient  Outcome Summary:   IV ABT   2/5L O2   PRN dilaudid   Plan of Care Review  Plan of Care Reviewed With: patient  Progress: improving  Outcome Summary: IV ABT; 2/5L O2; PRN dilaudid

## 2022-09-01 NOTE — DISCHARGE SUMMARY
Internal Medicine  Inpatient Discharge Summary        BRIEF OVERVIEW   Admitting Provider: Shashi Bauman MD  Attending Provider: Jessica Hope MD Attending phys phone: [phone]    PCP: Parker Rodriguez MD (Inactive) [phone]    Admission Date: 8/30/2022  Discharge Date: 9/2/2022     DISCHARGE DIAGNOSES      Primary Discharge Diagnosis  Pneumonia of right lower lobe due to infectious organism    Secondary Discharge Diagnoses  Active Hospital Problems    Diagnosis Date Noted    Pneumonia of right lower lobe due to infectious organism 08/30/2022     Priority: High    Anemia 08/30/2022    H/O aortic valve replacement 08/01/2022    Vitamin D deficiency 08/01/2022    COPD (chronic obstructive pulmonary disease) (CMS/[facility]) 04/03/2016    Anxiety disorder, unspecified 05/25/2011      Resolved Hospital Problems   No resolved problems to display.       Active Problem List on Day of Discharge  Patient Active Problem List   Diagnosis    Rib fractures    Pneumonia of right lower lobe due to infectious organism    Anxiety disorder, unspecified    COPD (chronic obstructive pulmonary disease) (CMS/[facility])    H/O aortic valve replacement    Vitamin D deficiency    Anemia     SUMMARY OF HOSPITALIZATION      Presenting Problem/History of Present Illness  This is a 87 y.o. year-old female admitted on 8/30/2022 with Pneumonia of right lower lobe due to infectious organism [J18.9].    Ms. Howe is a 87 year old lady with a past medical history of mechanical AVR on coumadin, COPD on nighttime O2, CAD s/p CABG, vitamin D deficiency, HTN who presents from home with SOB and cough.    Patient notes that she has been suffering from chronic shortness of breath for the last few months with recurrent COPD exacerbations requiring hospitalization.  Over the last week shortness of breath was worsening and present with minimal exertion.  Patient denies any orthopnea, paroxysmal nocturnal dyspnea, lower extremity  swelling but endorses increasing cough with white phlegm production.  No sick contacts or constitutional symptoms.    On arrival to ED she was afebrile (98.6F), , RR 26, /58, saturating 91% on room air.  Notable ED labs: WBC 12.49 with neutrophil predominance/ Hgb 10.8 (baselne 9-10s), INR 1.8, Cr 1, Lactate 1.4 / HSTrop 14-->14  BNP-344, troponin delta negative  EKG NSR    ED Imaging: CXR R basilar opacity   CTPE: no evidence of PE to prox seg branches, RLL opacification PNA/pneumonitis/atelectasis, midline sternotomy, compression deformity T7/T9 with mild retropulsion    She received 1g CTX and 500mg azithromycin and was admitted into our care.    Hospital Course  #RLL Pneumonia  RLL pneumonia visible of CXR and CT. Blood cultures NGTD. On presentation she required 2L nasal cannula which she no longer required after 3 days of antibiotic therapy. Due to location of pneumonia, we had SLP evaluate and clear Ms. Howe for aspiration risk. She received IV ceftriaxone and azithromycin in the ED. We continued these antibiotics during her admission and she will be discharged on oral cefuroxime 500mg twice daily for a total of 7 days including days she received IV ceftriaxone (last day 9/5/2022) and continue her 5 day course of azithromycin 500mg once daily (last day 9/3/2022).  - complete antibiotic course     #COPD  History of recurrent COPD exacerbations, on trelegy and 2.5L home oxygen at night. Patient with no concern for COPD exacerbation as inpatient, she was continued on maintenance inhalers. No indication for daily prednisone dosing, which was discontinued on discharge. She should continue to wear oxygen nightly.   - She will need outpatient follow up with pulmonology at [place].     #Anemia  Hemoglobin 10.8 on admission which was at baseline. No active bleeding. B12 and folate normal. Iron studies indicated component of iron deficiency anemia. We therefore started her on IV iron. She will be  discharged on oral iron.  - outpatient follow up with PCP for anemia and colonoscopy     #Hypertension  Maintained on diltaizem 120mg three times daily at home. We maintained her on her home medication.    #History of mechanical AVR  She has a mechanical AVR on warfarin.     #Anxiety  Maintained on zolpidem PRN at night and lorazepam PRN     #Osteoporosis  Secondary to vitamin D deficiency. CT with severe age-indeterminate compression deformity at T7 and T9 with mild retropulsion of the inferior vertebral body osseous fragments and mild spinal canal narrowing at these levels. Maintained at home with dilaudid PRN. We continued home pain control medications.   - Will need outpatient follow up for osteoporosis at [place].  - Bowel regimen with opioid use         Exam on Day of Discharge  Physical Exam  Vitals reviewed.   Constitutional:       General: She is not in acute distress.     Appearance: Normal appearance.   HENT:      Head: Atraumatic.      Mouth/Throat:      Mouth: Mucous membranes are moist.   Neck:      Vascular: No carotid bruit.   Cardiovascular:      Rate and Rhythm: Normal rate and regular rhythm.      Pulses: Normal pulses.      Heart sounds: Normal heart sounds. No murmur heard.  Pulmonary:      Effort: Pulmonary effort is normal. No respiratory distress.      Breath sounds: No wheezing.   Abdominal:      General: Bowel sounds are normal.      Palpations: Abdomen is soft.      Tenderness: There is no abdominal tenderness. There is no guarding.   Musculoskeletal:      Cervical back: Neck supple. No tenderness.      Right lower leg: No edema.      Left lower leg: No edema.      Comments: kyphosis    Lymphadenopathy:      Cervical: No cervical adenopathy.   Skin:     General: Skin is warm.   Neurological:      General: No focal deficit present.      Mental Status: She is alert and oriented to person, place, and time.         Consults During Admission  None    DISCHARGE MEDICATIONS   New, changed, or  stopped medications from this admission:       Medication List      START taking these medications    azithromycin 250 mg tablet  Commonly known as: ZITHROMAX  Start taking on: September 3, 2022  Take 1 tablet (250 mg total) by mouth once for 1 dose Indications: pneumonia.  Dose: 250 mg     cefuroxime 500 mg tablet  Commonly known as: CEFTIN  Start taking on: September 3, 2022  Take 1 tablet (500 mg total) by mouth 2 (two) times a day for 3 days.  Dose: 500 mg     ferrous sulfate 325 mg (65 mg iron) tablet  Take 1 tablet (325 mg total) by mouth every other day.  Dose: 325 mg     furosemide 20 mg tablet  Commonly known as: LASIX  Start taking on: September 3, 2022  Take 1 tablet (20 mg total) by mouth daily.  Dose: 20 mg     ondansetron ODT 4 mg disintegrating tablet  Commonly known as: ZOFRAN-ODT  Take 1 tablet (4 mg total) by mouth every 12 (twelve) hours as needed for nausea or vomiting (if worsening nausea) for up to 3 days.  Dose: 4 mg     polyethylene glycol 17 gram packet  Commonly known as: MIRALAX  Take 17 g by mouth daily. Hold if having loose stools  Dose: 17 g     sennosides-docusate sodium 8.6-50 mg  Commonly known as: SENNA WITH DOCUSATE SODIUM  Take 1 tablet by mouth daily. Hold if having diarrhea or loose stool  Dose: 1 tablet        CHANGE how you take these medications    rosuvastatin 10 mg tablet  Commonly known as: CRESTOR  Take 1 tablet (10 mg total) by mouth nightly.  Dose: 10 mg  What changed:   · medication strength  · how much to take     zolpidem 5 mg tablet  Commonly known as: AMBIEN  Take 5 mg by mouth nightly.  Dose: 5 mg  What changed: Another medication with the same name was removed. Continue taking this medication, and follow the directions you see here.        CONTINUE taking these medications    acetaminophen 325 mg tablet  Commonly known as: TYLENOL  Take 3 tablets (975 mg total) by mouth every 6 (six) hours.  Dose: 975 mg     albuterol HFA 90 mcg/actuation inhaler  Commonly known  as: VENTOLIN HFA  Inhale 2 puffs every 6 (six) hours as needed for wheezing.  Dose: 2 puff     diltiazem  mg 24 hr tablet  Commonly known as: CARDIZEM LA  Take 120 mg by mouth 3 (three) times a day.  Dose: 120 mg     fluticasone-umeclidinium-vilanterol 100-62.5-25 mcg blister with device powder for inhalation  Commonly known as: TRELEGY ELLIPTA  Inhale 1 puff daily.  Dose: 1 puff     HYDROmorphone 2 mg tablet  Commonly known as: DILAUDID  Take 2-4 mg by mouth every 4 (four) hours as needed for moderate pain or severe pain.  Dose: 2-4 mg     ipratropium 0.02 % nebulizer solution  Commonly known as: ATROVENT  Take 500 mcg by nebulization 4 (four) times a day as needed for wheezing or shortness of breath.  Dose: 500 mcg     LORazepam 0.5 mg tablet  Commonly known as: ATIVAN  Take 0.5 mg by mouth every 6 (six) hours as needed for anxiety.  Dose: 0.5 mg     meclizine 12.5 mg tablet  Commonly known as: ANTIVERT  Take 12.5 mg by mouth 3 (three) times a day as needed for dizziness.  Dose: 12.5 mg     senna 8.6 mg tablet  Commonly known as: SENOKOT  Take 1 tablet by mouth daily.  Dose: 1 tablet     warfarin 2.5 mg tablet  Commonly known as: COUMADIN  Take 2.5 mg by mouth every evening.  Dose: 2.5 mg        STOP taking these medications    predniSONE 5 mg tablet  Commonly known as: DELTASONE     torsemide 5 mg tablet  Commonly known as: DEMADEX            Non-Medication orders at discharge:   Instructions for after discharge     Admission H&P Valid:  Yes      Call provider for:  difficulty breathing, headache or visual disturbances      Call provider for:  extreme fatigue      Call provider for:  persistent dizziness or light-headedness      Call provider for:  persistent nausea or vomiting      Call provider for:  temperature >100.4      Discharge Condition:  Improving      Discharge Potential:  Length of Stay < 30 Days      Discharge Summary:  Enclosed      Discharge diet      Diet Type / Texture:  Regular  Cardiac  (Low Sodium, Low Fat)       Fluid Consistency: Thin Liquids    Follow Up:  With Primary MD within 1 week discharge from facility      Follow-up with Provider:      Vimal Pittman DO   [phone]    [address] [facility] [address]       Follow-up with primary physician (PCP)      Follow-up with provider      Chalo Bailey MD   [phone]    [address]  [address]  [facility] [address]       Follow-up:      Please follow up with pulmonologist:  Mahi Echavarria MD    [address]    [address]    [place] [address]    Phone: [phone]    Fax: [phone]    Free of Communicable Disease:   Yes      Level of Care:  Skilled      Occupational Therapy Eval and Treat      Patient Aware of Diagnosis: Yes      Physical Therapy Eval and Treat      Post-Discharge Activity: Normal activity as tolerated.      Normal activity as tolerated.    Rehab Potential:  Good      Speech Therapy Eval and Treat      Vital Signs Per Facility Protocol      Weight Bearing Status      Weights Per Facility Protocol                      PROCEDURES / LABS / IMAGING      Operative Procedures  NA    Other Procedures  NA    Pertinent Labs  Results from last 7 days   Lab Units 09/02/22 0416 09/01/22  0613 08/31/22  0451   WBC K/uL 10.86* 7.84 7.77   HEMOGLOBIN g/dL 10.0* 9.4* 9.8*   HEMATOCRIT % 32.8* 30.4* 32.3*   PLATELETS K/uL 261 244 251     Results from last 7 days   Lab Units 09/02/22  0416 09/01/22  0613 08/31/22  0451 08/30/22  1313   SODIUM mEQ/L 141 137 140 136   POTASSIUM mEQ/L 5.0 3.5* 4.8 3.9   CHLORIDE mEQ/L 100 97* 101 92*   CO2 mEQ/L 35* 34* 34* 34*   BUN mg/dL 18 17 15 15   CREATININE mg/dL 1.1 1.0 1.0 1.0   CALCIUM mg/dL 8.8* 8.4* 8.6* 9.0   ALBUMIN g/dL  --   --   --  2.8*   BILIRUBIN TOTAL mg/dL  --   --   --  1.0   ALK PHOS IU/L  --   --   --  96   ALT IU/L  --   --   --  15   AST IU/L  --   --   --  23   GLUCOSE mg/dL 96 90 108* 112*     Pertinent Imaging  CT ANGIOGRAPHY CHEST PULMONARY EMBOLISM  WITH IV CONTRAST    Result Date: 8/30/2022  IMPRESSION: 1.  No evidence of acute pulmonary embolism to the level of the proximal segmental branches. 2.  Opacification of multiple airways in the right lower lobe beginning at the right mainstem bronchus with dependent airspace opacity at the right lower lobe which could represent aspiration pneumonia/pneumonitis/atelectasis. 3.  Widely  midline sternotomy as in comment. 4.  Severe age-indeterminate compression deformity at T7 and T9 with mild retropulsion of the inferior vertebral body osseous fragments and mild spinal canal narrowing at these levels. 5.  See comment for additional findings.     X-RAY CHEST 1 VIEW    Result Date: 8/30/2022  IMPRESSION: Right basilar opacity.      OUTPATIENT  FOLLOW-UP / REFERRALS / PENDING TESTS      Outpatient Follow-Up Appointments  Encounter Information    This patient does not currently have any appointments scheduled.         Referrals  No orders of the defined types were placed in this encounter.      Test Results Pending at Discharge  Unresulted Labs (From admission, onward)             Start     Ordered    09/01/22 0600  Protime-INR, daily for 3 days  (warfarin - therapy by pharmacy protocol)  Daily      Question:  Release to patient  Answer:  Immediate   Start Status   09/02/22 0600 Scheduled   09/03/22 0600 Scheduled   09/04/22 0600 Scheduled       08/31/22 2106    08/31/22 0600  CBC and differential  Daily      Question:  Release to patient  Answer:  Immediate   Start Status   09/02/22 0600 Scheduled   09/03/22 0600 Scheduled   09/04/22 0600 Scheduled   09/05/22 0600 Scheduled   09/06/22 0600 Scheduled       08/30/22 2015 08/31/22 0600  Basic metabolic panel  Daily      Question:  Release to patient  Answer:  Immediate   Start Status   09/02/22 0600 Scheduled   09/03/22 0600 Scheduled   09/04/22 0600 Scheduled   09/05/22 0600 Scheduled   09/06/22 0600 Scheduled       08/30/22 2016 08/31/22 0600  Magnesium   Daily      Question:  Release to patient  Answer:  Immediate   Start Status   09/02/22 0600 Scheduled   09/03/22 0600 Scheduled   09/04/22 0600 Scheduled   09/05/22 0600 Scheduled   09/06/22 0600 Scheduled       08/30/22 2016 08/31/22 0600  Vitamin D 1,25-Dihydroxy  Morning draw        Question:  Release to patient  Answer:  Immediate    08/30/22 2024 08/30/22 2137  Legionella antigen, urine  Once        Question:  Release to patient  Answer:  Immediate    08/30/22 2136 08/30/22 2026  MRSA Screen, Nares Only Nose  Once        Question:  Release to patient  Answer:  Immediate    08/30/22 2025 08/30/22 2020  Sputum culture / smear Expectorated Sputum  Once        Question:  Release to patient  Answer:  Immediate    08/30/22 2019 08/30/22 2020  Sputum Gram Stain (Lab Only) Expectorated Sputum  PROCEDURE ONCE        Question:  Release to patient  Answer:  Immediate    08/30/22 2019              Important Issues to Address in Follow-Up  · Follow up with PCP 1-2 weeks after discharge.  · Follow up with Cardiology (Dr. Bailey) for anemia, hypertension, mechanical valve.  · Follow up with Dr. Pittman at [place] for osteoporosis and ongoing pain needs.  · Follow up with [place] for pulmonology.  DISCHARGE DISPOSITION      Disposition: Home Health Care - [facility]    Code Status At Discharge: Limited Code    Physician Order for Life-Sustaining Treatment Document Status      No documents found                 ATTENDING DOCUMENTATION  ALSO SEE ATTENDING ATTESTATION SECTION OF NOTE

## 2022-09-01 NOTE — PROGRESS NOTES
Patient:  Bonny Howe  Location:  [place] [address]  MRN:  [id number]  Today's date:  9/1/2022    Pt received: Seated in recliner, RN and patient consent to session  End of session: Seated in recliner, personal items in reach, call bell in reach, chair alarm on  Nursing aware of session.    Bonny is a 87 y.o. female admitted on 8/30/2022 with Pneumonia of right lower lobe due to infectious organism [J18.9]. Principal problem is Pneumonia of right lower lobe due to infectious organism.    Past Medical History  Bonny has no past medical history on file.    History of Present Illness   PNA      OT Vitals    Date/Time SpO2 Pt Activity O2 Therapy O2 Del Method O2 Flow Rate BP BP Location BP Method Pt Position [facility]   09/01/22 0955 98 % At rest Supplemental oxygen Nasal cannula 2.5 L/min 161/70 Right upper arm Automatic Sitting ATK   09/01/22 1005 90 % post in room mobili At rest Supplemental oxygen Nasal cannula 2.5 L/min -- -- -- -- ATK   09/01/22 1015 92 % After seated rest break At rest Supplemental oxygen Nasal cannula 2.5 L/min -- -- -- -- ATK      OT Pain    Date/Time Pain Type Rating: Rest Rating: Activity [facility]   09/01/22 0955 Pain Assessment 0 0 ATK          Prior Living Environment    Flowsheet Row Most Recent Value   Current Living Arrangements independent living facility   Living Environment Comment pt lives in independent living facility alone, 0STE, elevator access, stall shower with grab bar and shower chair        Prior Level of Function    Flowsheet Row Most Recent Value   Dominant Hand right   Ambulation assistive equipment   Transferring independent   Toileting independent   Bathing independent   Dressing independent   Eating independent   Communication understands/communicates without difficulty   Swallowing swallows foods/liquids without difficulty   Baseline Diet/Method of Nutritional Intake regular, thin liquids   Past History of Dysphagia patient denies prior dysphagia  hx   Assistive Device Currently Used at Home walker, front-wheeled        Occupational Profile    Flowsheet Row Most Recent Value   Reason for Services/Referral ADL Evaluation/dispo planning   Environmental Supports and Barriers Living in [facility], has previously received OT services at facility   Patient Goals d/c back to ILF, get off O2           OT Evaluation and Treatment - 09/01/22 0953        OT Time Calculation    Start Time 0953     Stop Time 1026     Time Calculation (min) 33 min        Session Details    Document Type initial evaluation     Mode of Treatment occupational therapy        General Information    Patient Profile Reviewed yes     Referring Physician Master     General Observations of Patient Rec'd seated in recliner, agreeable to OT session     Existing Precautions/Restrictions aspiration;fall;oxygen therapy device and L/min   2.5 L via NC    Limitations/Impairments swallowing        Cognition/Psychosocial    Affect/Mental Status (Cognition) WFL;anxious     Orientation Status (Cognition) oriented x 4     Follows Commands (Cognition) WFL;follows multi-step commands;delayed response/completion;increased processing time needed     Cognitive Function safety deficit     Safety Deficit (Cognition) minimal deficit;awareness of need for assistance;insight into deficits/self-awareness;impulsivity;judgment;safety precautions follow-through/compliance     Comment, Cognition Pt AAOx4 in session, showing decreased insight to current functional deficits and need to wear O2. Benefittinf from frequent safety cues for proper use of RW. Able to make needs known and participate in care.        Hearing Assessment    Hearing Status WFL        Sensory Assessment    Sensory Assessment (Somatosensory) UE sensation intact;bilateral UE        Range of Motion (ROM)    Range of Motion ROM is WFL;bilateral upper extremities        Strength (Manual Muscle Testing)    Strength (Manual Muscle Testing) strength is WFL;bilateral  upper extremities   Grossly 5/5       Bed Mobility    Comment (Bed Mobility) Rec'd OOB in recliner        Sit to Stand Transfer    [place], Sit to Stand Transfer supervision;verbal cues     Verbal Cues safety     Assistive Device walker, front-wheeled     Comment Multiple STS from recliner, cues for RW placement. Functional mobility within room using RW        Stand to Sit Transfer    [place], Stand to Sit Transfer supervision;verbal cues     Verbal Cues safety     Assistive Device walker, front-wheeled     Comment STS back to recliner.        Toilet Transfer    Transfer Technique sit-stand;stand-sit     [place], Toilet Transfer supervision;verbal cues     Verbal Cues safety     Assistive Device walker, front-wheeled     Comment STS on standard toilet. Decreased safety awareness, cues to maintain use of RW. Use of GB on R.        Safety Issues, Functional Mobility    Safety Issues Affecting Function (Mobility) awareness of need for assistance;impulsivity;insight into deficits/self-awareness;judgment        Balance    Static Sitting Balance WFL;sitting in chair     Dynamic Sitting Balance WFL;sitting in chair     Static Standing Balance WFL;supported     Dynamic Standing Balance WFL;supported     Comment, Balance Use of RW        Bathing    Self-Performance chest;left arm;right arm;front perineal area;abdomen;buttocks     Position supported standing;sink side     Comment Standing sinkside to complete modified bathing with wet wipes        Upper Body Dressing    Tasks don;doff;hospital gown     [place] supervision     Position supported sitting        Lower Body Dressing    Tasks don;doff;shoes/slippers     [place] supervision     Position supported sitting        Grooming    Self-Performance washes, rinses and dries face;washes, rinses and dries hands;oral care (brushing teeth, cleaning dentures);brushes/mercedes hair     [place] supervision     Position supported sitting;supported standing      Comment Grooming ADLs completed standing sinkside and seated in recliner, supervision for safety while standing        Toileting    [place] adjust/manage clothing;perform bladder hygiene;supervision     Position supported sitting     Comment Pt able to void while seated on standard toilet, seated to complete hygiene        BADL Safety/Performance    Impairments, BADL Safety/Performance endurance/activity tolerance;strength;shortness of breath     Skilled BADL Treatment/Intervention BADL process/adaptation training;compensatory training;cognitive/safety deficit modifications        ADL Interventions    Energy Conservation Techniques activity adapted to sitting;activity pacing encouraged;breathing techniques encouraged     Self-Care (BADL) Promotion best position for BADL determined        AM-PAC (TM) - ADL (Current Function)    Putting on and taking off regular lower body clothing? 3 - A Little     Bathing? 3 - A Little     Toileting? 3 - A Little     Putting on/taking off regular upper body clothing? 3 - A Little     How much help for taking care of personal grooming? 4 - None     Eating meals? 4 - None     AM-PAC (TM) ADL Score 20        Assessment/Plan (OT)    Daily Outcome Statement OT Evaluation completed. Pt presenting with PNA, SOB. Supervision for all fucntional mobility and transfers with RW. Frequent cueing needed for safety awareness, need to wear O2. Supervision to complete modified bathing, dressing. Recommending d/c home with HHOT.     Rehab Potential good, to achieve stated therapy goals     Therapy Frequency 3-5 times/wk     Planned Therapy Interventions activity tolerance training;adaptive equipment training;BADL retraining;functional balance retraining;IADL retraining;occupation/activity based interventions;prosthetic fitting/training;strengthening exercise;transfer/mobility retraining               OT Assessment/Plan    Flowsheet Row Most Recent Value   OT Recommended Discharge Disposition  home with assistance, home with home health at 09/01/2022 0953   Anticipated Equipment Needs At Discharge (OT) none at 09/01/2022 0953   Patient/Family Therapy Goal Statement D/c home at 09/01/2022 0953                    Education Documentation  Self-Care, taught by Criselda Durán, OT at 9/1/2022  1:48 PM.  Learner: Patient  Readiness: Acceptance  Method: Explanation  Response: Verbalizes Understanding  Comment: Role of OT, OT POC, adapted self care strategies, energy conservation, d/c planning          OT Goals    Flowsheet Row Most Recent Value   Bed Mobility Goal 1    Activity/Assistive Device bed mobility activities, all at 09/01/2022 0953   [place] modified independence at 09/01/2022 0953   Time Frame by discharge at 09/01/2022 0953   Progress/Outcome goal ongoing at 09/01/2022 0953   Transfer Goal 1    Activity/Assistive Device all transfers at 09/01/2022 0953   [place] modified independence at 09/01/2022 0953   Time Frame by discharge at 09/01/2022 0953   Progress/Outcome goal ongoing at 09/01/2022 0953   Dressing Goal 1    Activity/Adaptive Equipment dressing skills, all at 09/01/2022 0953   [place] modified independence at 09/01/2022 0953   Time Frame by discharge at 09/01/2022 0953   Progress/Outcome goal ongoing at 09/01/2022 0953   Toileting Goal 1    Activity/Assistive Device toileting skills, all at 09/01/2022 0953   [place] modified independence at 09/01/2022 0953   Time Frame by discharge at 09/01/2022 0953   Progress/Outcome goal ongoing at 09/01/2022 0953   Grooming Goal 1    Activity/Assistive Device grooming skills, all at 09/01/2022 0953   [place] modified independence at 09/01/2022 0953   Time Frame by discharge at 09/01/2022 0953   Progress/Outcome goal ongoing at 09/01/2022 0953

## 2022-09-01 NOTE — PLAN OF CARE
Problem: Adult Inpatient Plan of Care  Goal: Plan of Care Review  Outcome: Progressing  Flowsheets (Taken 9/1/2022 1342)  Progress: improving  Plan of Care Reviewed With: patient  Outcome Summary: OT Evaluation completed. Recommending d/c home with assistance and HHOT.     Problem: Self-Care Deficit  Goal: Improved Ability to Complete Activities of Daily Living  Outcome: Progressing  Intervention: Promote Activity and Functional [place]  Flowsheets (Taken 9/1/2022 0682)  Activity Assistance Provided: assistance, 1 person

## 2022-09-01 NOTE — PATIENT CARE CONFERENCE
Care Progression Rounds Note  Date: 9/1/2022  Time: 5:01 PM     Patient Name: Bonny Howe     Medical Record Number: [id number]   YOB: 1935  Sex: Female      Room/Bed: 0165    Admitting Diagnosis: Pneumonia of right lower lobe due to infectious organism [J18.9]   Admit Date/Time: 8/30/2022 12:42 PM    Primary Diagnosis: Pneumonia of right lower lobe due to infectious organism  Principal Problem: Pneumonia of right lower lobe due to infectious organism    GMLOS: 3.1  Anticipated Discharge Date: 9/2/2022    AM-PAC:  Mobility Score: 24    Discharge Planning:  Current Living Arrangements: independent living facility  Concerns to be Addressed: care coordination/care conferences, discharge planning, home safety  Anticipated Discharge Disposition: home without assistance or services  Type of Home Care Services: nursing, home PT, home health aide  Type of Skilled Nursing Care Services: PT, nursing    Barriers to Discharge:  Medical issues not resolved    Comments:       Participants:  , physical therapy, physician, nursing, social work/services

## 2022-09-01 NOTE — PROGRESS NOTES
[facility]: Referral received, chart reviewed. Met  with patient, who agrees to SN/PT/OT services, eval for HHA/MSW  with [facility].  HC referral complete. Antonia García RN [facility] [id number]

## 2022-09-01 NOTE — STUDENT
Internal Medicine  Daily Progress Note       SUBJECTIVE   This is a 87 y.o. year-old female admitted on 2022 with Pneumonia of right lower lobe due to infectious organism [J18.9].    Interval History: no acute events overnight. Pt c/o some nausea this am, no v/d. No BM x5 days. Will treat to obtain a BM. Pt received ondansetron SL for nausea with subjective improvement this AM.     OBJECTIVE      Vital signs in last 24 hours:  Temp:  [36.3 °C (97.3 °F)-36.6 °C (97.9 °F)] 36.6 °C (97.8 °F)  Heart Rate:  [] 105  Resp:  [18-20] 20  BP: (123-152)/(57-69) 123/60  Temp (24hrs), Av.5 °C (97.7 °F), Min:36.3 °C (97.3 °F), Max:36.6 °C (97.9 °F)    Intake/Output    None         PHYSICAL EXAMINATION      Physical Exam    General Survey: Pt is alert. Able to maintain natural conversation and eye contact with appropriate responses and questioning.  Vital signs: as documented above.  Skin: no jaundice, visible rashes.  Head: normocephalic, atraumatic.  Eyes: pupils equal and round.  Ears: able to understand conversation at natural volume. Atraumatic, no external discharge.  Nose: no external discharge.  Throat: no swelling or erythema.  Neck: trachea midline, no bruits bilaterally, no stridor.  Lymph nodes: no enlarged lymph nodes noted.  Thorax and Lungs: crackles to front and back B/L. Chest rise equal with no crepitus.  Cardiovascular: + S1 + S2 - S2 - S4. No murmurs, gallops, or rubs noted to auscultation.  Breasts: deferred.  Abdomen: soft and non-tender, no pain to palpation. Bowel sounds auscultated in lower right quadrant.  Genitalia: deferred.  Rectal: deferred.  Peripheral vascular: no edema.  Musculoskeletal: strength equal in all 4 limbs.  Neurologic: awake, alert, and oriented x4 to person, place, time, and event. Sensation to light touch and pressure preserved x4 limbs.     LINES, CATHETERS, DRAINS, AIRWAYS, AND WOUNDS   Lines, Drains, Airways, Wounds:  Peripheral IV (Adult) 22  Anterior;Right Hand (Active)   Number of days: 2       Peripheral IV (Adult) 08/30/22 Anterior;Left Hand (Active)   Number of days: 2       Comments:      LABS / IMAGING / TELE      Labs  I have reviewed the patient's pertinent labs. Pertinent labs are within normal limits.    Imaging personally reviewed(does not include unread studies):  CT ANGIOGRAPHY CHEST PULMONARY EMBOLISM WITH IV CONTRAST    Result Date: 8/30/2022  IMPRESSION: 1.  No evidence of acute pulmonary embolism to the level of the proximal segmental branches. 2.  Opacification of multiple airways in the right lower lobe beginning at the right mainstem bronchus with dependent airspace opacity at the right lower lobe which could represent aspiration pneumonia/pneumonitis/atelectasis. 3.  Widely  midline sternotomy as in comment. 4.  Severe age-indeterminate compression deformity at T7 and T9 with mild retropulsion of the inferior vertebral body osseous fragments and mild spinal canal narrowing at these levels. 5.  See comment for additional findings.     X-RAY CHEST 1 VIEW    Result Date: 8/30/2022  IMPRESSION: Right basilar opacity.      ECG/Telemetry  I have independently reviewed the telemetry. No events for the last 24 hours.    ASSESSMENT AND PLAN   Pneumonia:  Acute, unchanged  Will continue treatment with ceftriaxone and azithromycin. Possible HAP given her recent stay at [facility], pending sputum cultures. Will wean O2 as tolerated since pt does not use O2 during the day. Speech therapy has no swallowing concerns after assessing pt. SpO2 in high 80s on RA, 92-94% on 2L. .       COPD:  Chronic, unchanged.  Continue LABA+LAMA+ICS to manage. O2 was weaned to 1L/min at 13:00 and sustained at 94-95% on RA for 15 minutes. Pt was instructed to ask for more O2 if needed, she is well accustomed to using home O2. Will encourage incentive spirometer use. Pt should be in chair when possible and should walk today.      HF:  Chronic, improved.  No  signs of edema or fluid retention due to recent diuresis at hospital stay. Will monitor dry weights.     AVR:  Chronic, unchanged.  Will maintain therapeutic INR of 2.0-3.0.      HTN:  Chronic, unchanged.  Maintain diltiazem.     Anemia:  Unknown  Iron studies indicate possible iron-deficiency. Will replete iron IV x 5 days and monitor. Fe was started on 8/31.     CKD stage 3a  Chronic, unchanged.  eGFR and Cr at baseline. Will monitor and avoid unnecessary nephrotoxic medications.    Constipation and nausea:  Ordered bowel regimen including miralax to encourage BM. May consider ondansetron as needed for pt nausea.           Anemia  Assessment & Plan  Hgb 10.8 (baseline 9-10s)  No active bleeding  Iron studies - iron + sat low, ferritin low-normal  B12 + folate normal    - Trend with daily CBC  - IV iron 5 days    Vitamin D deficiency  Assessment & Plan  Hx of vitamin D deficiency and osteoporosis   Follows at [facility]   CT with Severe age-indeterminate compression deformity at T7 and T9 with mild   retropulsion of the inferior vertebral body osseous fragments and mild spinal   canal narrowing at these levels.     - outpatient follow up for osteoporosis   - follow up vitamin D level  - dilaudid PRN home dose for pain       H/O aortic valve replacement  Assessment & Plan  Hx of CABG and AVR   Previously followed at [place] and has cardiologist at [facility]   On warfarin     - continue warfarin by pharmacy protocol   - on diltiazem 3 times daily for blood pressure and rate control     COPD (chronic obstructive pulmonary disease) (CMS/[facility])  Assessment & Plan  Patient with history of recurrent COPD exacerbations and restrictive lung disease.  She is on trelegy   On home oxygen 2.5 L nightly however has been taking continuously last 2 weeks per her pulm request   Last COPD exacerbation requiring steroids 7/2022  Follows with pulm at [place]   Unable to bring her Trelegy     Non wheezy on exam. Do not suspect an acute  COPD exacerbation     - Maintenance inhaler- Spiriva and Dulera  - Duoneb q6 PRN   - vbg to assess CO2      Anxiety disorder, unspecified  Assessment & Plan  Continue zolpidem nightly   PRN ativan     * Pneumonia of right lower lobe due to infectious organism  Assessment & Plan  Patient with worsening shortness of breath over the last week, increased phlegm on background hx of COPD   Covid negative; lactate wnl    CXR R basilar opacity   CTPE: no evidence of PE to prox seg branches, RLL opacification PNA/pneumonitis/atelectasis, midline sternotomy, compression deformity T7/T9 with mild retropulsion    - Continue CTX and azithromycin for 5 days   - Trend CBC   - Trend fever curve  - Hold off on steroids   - Incentive spirometry given restrictive lung physiology   - f/u sputum and blood culture, MRSA nares, urine legionella   - unclear if underlying heart failure given patient on torsemide. Continue torsemide - monitor for worsening renal function   - Strict I/O, daily standing weights  - SLP consult to see if swallowing dysfunction due to PNA in RLL           VTE Assessment: [place]    VTE Prophylaxis: Current anticoagulants:  warfarin (COUMADIN) therapy by pharmacy protocol, Other, evaluate daily      Code Status: Limited Code  Estimated discharge date: 9/2/2022     ATTENDING DOCUMENTATION  ALSO SEE ATTENDING ATTESTATION SECTION OF NOTE

## 2022-09-01 NOTE — PROGRESS NOTES
Internal Medicine  Daily Progress Note       SUBJECTIVE   This is a 87 y.o. year-old female admitted on 8/30/2022 with Pneumonia of right lower lobe due to infectious organism [J18.9].    Interval History: I observed and examined  today at the bedside. She appeared comfortable when I went and examined her, alert, sitting up in bed, saturating appropriately on 2.5L nasal cannula. There were no reported events overnight.     denied any chest pain or shortness of breath. Her primary complaint this morning was nausea for which I have added PRN zolfran.     OBJECTIVE   Vital signs in last 24 hours:  Temp:  [36.3 °C (97.3 °F)-36.6 °C (97.9 °F)] 36.6 °C (97.9 °F)  Heart Rate:  [78-92] 89  Resp:  [18-20] 20  BP: (125-152)/(57-69) 125/57  SpO2:  [87 %-98 %] 94 %  Oxygen Therapy: Supplemental oxygen  O2 Delivery Method: Nasal cannula  O2 Flow Rate (L/min):  [2.5 L/min-3 L/min] 2.5 L/min    Weight & I/Os:  Weights (last 5 days)     Date/Time Weight    09/01/22 0600 54.4 kg (120 lb)    08/31/22 0600 55 kg (121 lb 4.8 oz)    08/30/22 2051 55.7 kg (122 lb 14.4 oz)    08/30/22 1247 53.5 kg (118 lb)        No intake or output data in the 24 hours ending 09/01/22 0659     PHYSICAL EXAMINATION   Physical Exam  Vitals reviewed.   Constitutional:       General: She is not in acute distress.     Appearance: Normal appearance.   HENT:      Head: Atraumatic.      Right Ear: External ear normal.      Left Ear: External ear normal.      Nose: Nose normal.      Mouth/Throat:      Mouth: Mucous membranes are moist.   Eyes:      Extraocular Movements: Extraocular movements intact.      Conjunctiva/sclera: Conjunctivae normal.   Neck:      Vascular: No carotid bruit.   Cardiovascular:      Rate and Rhythm: Normal rate and regular rhythm.      Pulses: Normal pulses.      Heart sounds: Normal heart sounds. No murmur heard.  Pulmonary:      Effort: Pulmonary effort is normal. No respiratory distress.      Breath sounds: Wheezing and  rales present.   Abdominal:      General: Abdomen is flat. Bowel sounds are normal.      Palpations: Abdomen is soft.      Tenderness: There is no abdominal tenderness. There is no guarding.   Musculoskeletal:      Cervical back: Neck supple. No tenderness.      Right lower leg: No edema.      Left lower leg: No edema.   Lymphadenopathy:      Cervical: No cervical adenopathy.   Skin:     General: Skin is warm.   Neurological:      General: No focal deficit present.      Mental Status: She is alert and oriented to person, place, and time.          LINES, CATHETERS, DRAINS, AIRWAYS, & WOUNDS   Lines, drains, airways, & wounds:  Peripheral IV (Adult) 08/30/22 Anterior;Right Hand (Active)   Number of days: 2       Peripheral IV (Adult) 08/30/22 Anterior;Left Hand (Active)   Number of days: 2        LABS, IMAGING, & TELE   Labs:  I have reviewed the patient's pertinent labs.  Significant abnormals are Hb 9.4, K 3.5.    Results from last 7 days   Lab Units 09/01/22  0613 08/31/22  0451 08/30/22  1313   WBC K/uL 7.84 7.77 12.49*   HEMOGLOBIN g/dL 9.4* 9.8* 10.8*   HEMATOCRIT % 30.4* 32.3* 34.7*   PLATELETS K/uL 244 251 265       Results from last 7 days   Lab Units 09/01/22  0613 08/31/22  0451 08/30/22  1313   SODIUM mEQ/L 137 140 136   POTASSIUM mEQ/L 3.5* 4.8 3.9   CHLORIDE mEQ/L 97* 101 92*   CO2 mEQ/L 34* 34* 34*   BUN mg/dL 17 15 15   CREATININE mg/dL 1.0 1.0 1.0   CALCIUM mg/dL 8.4* 8.6* 9.0   ALBUMIN g/dL  --   --  2.8*   BILIRUBIN TOTAL mg/dL  --   --  1.0   ALK PHOS IU/L  --   --  96   ALT IU/L  --   --  15   AST IU/L  --   --  23   GLUCOSE mg/dL 90 108* 112*     Imaging personally reviewed (does not include unread studies):  No results found.  Telemetry/EKG:  I have independently reviewed the telemetry. No events for the last 24 hours.     ASSESSMENT & PLAN   * Pneumonia of right lower lobe due to infectious organism  Assessment & Plan  Patient with worsening shortness of breath over the last week, increased  phlegm on background hx of COPD   Covid negative; lactate wnl    CXR R basilar opacity   CTPE: no evidence of PE to prox seg branches, RLL opacification PNA/pneumonitis/atelectasis, midline sternotomy, compression deformity T7/T9 with mild retropulsion  SLP no aspiration risk    - Continue CTX and azithromycin for 5 days   - Trend CBC   - Trend fever curve  - Hold off on steroids   - Incentive spirometry given restrictive lung physiology   - f/u sputum and blood culture (NGTD), urine legionella   - Continue torsemide - monitor for worsening renal function   - Strict I/O, daily standing weights      Anemia  Assessment & Plan  Hgb 10.8 (baseline 9-10s)  No active bleeding  Iron studies - iron + sat low, ferritin low-normal  B12 + folate normal    - Trend with daily CBC  - IV iron 5 days    COPD (chronic obstructive pulmonary disease) (CMS/HCC)  Assessment & Plan  Patient with history of recurrent COPD exacerbations and restrictive lung disease.  She is on trelegy   On home oxygen 2.5 L nightly however has been taking continuously last 2 weeks per her pulm request   Last COPD exacerbation requiring steroids 7/2022  Follows with pulm at [place]   Unable to bring her Trelegy   Non wheezy on exam initially. Do not suspect an acute COPD exacerbation     - Maintenance inhaler- Spiriva and Dulera  - Duoneb q6 PRN   - vbg to assess CO2      Vitamin D deficiency  Assessment & Plan  Hx of vitamin D deficiency and osteoporosis   Follows at [facility]   CT with Severe age-indeterminate compression deformity at T7 and T9 with mild   retropulsion of the inferior vertebral body osseous fragments and mild spinal   canal narrowing at these levels.     - outpatient follow up for osteoporosis   - follow up vitamin D level  - dilaudid PRN home dose for pain       H/O aortic valve replacement  Assessment & Plan  Hx of CABG and AVR   Previously followed at [place] and has cardiologist at [facility]   On warfarin     - continue warfarin by  pharmacy protocol   - on diltiazem 3 times daily for blood pressure and rate control     Anxiety disorder, unspecified  Assessment & Plan  Continue zolpidem nightly   PRN ativan        VTE Assessment: Padua    VTE Prophylaxis: Current anticoagulants:  warfarin (COUMADIN) therapy by pharmacy protocol, Other, evaluate daily      Code Status: Limited Code  Estimated discharge date: 9/2/2022     ATTENDING DOCUMENTATION  ALSO SEE ATTENDING ATTESTATION SECTION OF NOTE

## 2022-09-01 NOTE — PROGRESS NOTES
Warfarin Dosing by Pharmacy Consult Follow up    Bonny Howe is a 87 y.o. female who has been consulted for warfarin dosing for Valve replacement-bioprosthetic.    Reviewed relevant clinical data including weight, previous warfarin doses, CBC and PT/INR levels:  PT   Date/Time Value Ref Range Status   09/01/2022 0613 24.2 (H) 12.2 - 14.5 sec Final   08/31/2022 0451 23.8 (H) 12.2 - 14.5 sec Final   08/30/2022 2129 22.2 (H) 12.2 - 14.5 sec Final     INR   Date/Time Value Ref Range Status   09/01/2022 0613 2.2   Final     Comment:     Moderate Intensity Anticoagulation = 2.0 to 3.0, High Intensity = 2.5 to 3.5   08/31/2022 0451 2.2   Final     Comment:     Moderate Intensity Anticoagulation = 2.0 to 3.0, High Intensity = 2.5 to 3.5   08/30/2022 2129 2.0   Final     Comment:     Moderate Intensity Anticoagulation = 2.0 to 3.0, High Intensity = 2.5 to 3.5       CBC Results         09/01/22 08/31/22 08/30/22     0613 0451 1313    WBC 7.84 7.77 12.49    RBC 3.70 3.93 4.28    HGB 9.4 9.8 10.8    HCT 30.4 32.3 34.7    MCV 82.2 82.2 81.1    MCH 25.4 24.9 25.2    MCHC 30.9 30.3 31.1     251 265               Warfarin Administrations (last 96 hours)       Date/Time Action Medication Dose    08/31/22 1644 Given    warfarin (COUMADIN) tablet 2.5 mg 2.5 mg    08/30/22 2137 Given    warfarin (COUMADIN) tablet 2.5 mg 2.5 mg            Assessment/Plan  The patient is ordered warfarin dosing by pharmacy.      Pertinent medication interactions include: azithromycin and ceftriaxone    Warfarin INR level 2.2 after dose of 2.5 mg PO x1.  INR goal: 2 - 3    Will continue warfarin 2.5 mg PO x1.    Next INR:  09/02/2022 am labs    Patient is not receiving concurrent parenteral anticoagulation for bridging purposes.    Pharmacy will continue to follow the patient's warfarin dosing daily during this course of therapy.    Please call INR levels to the pharmacy.  Suzanne Brennan, [facility]

## 2022-09-02 VITALS
DIASTOLIC BLOOD PRESSURE: 86 MMHG | BODY MASS INDEX: 23.93 KG/M2 | HEART RATE: 99 BPM | WEIGHT: 121.9 LBS | RESPIRATION RATE: 18 BRPM | TEMPERATURE: 98.4 F | OXYGEN SATURATION: 96 % | HEIGHT: 60 IN | SYSTOLIC BLOOD PRESSURE: 143 MMHG

## 2022-09-02 LAB
ANION GAP SERPL CALC-SCNC: 6 MEQ/L (ref 3–15)
BASOPHILS # BLD: 0.03 K/UL (ref 0.01–0.1)
BASOPHILS NFR BLD: 0.3 %
BUN SERPL-MCNC: 18 MG/DL (ref 8–20)
CALCIUM SERPL-MCNC: 8.8 MG/DL (ref 8.9–10.3)
CHLORIDE SERPL-SCNC: 100 MEQ/L (ref 98–109)
CO2 SERPL-SCNC: 35 MEQ/L (ref 22–32)
CREAT SERPL-MCNC: 1.1 MG/DL (ref 0.6–1.1)
DIFFERENTIAL METHOD BLD: ABNORMAL
EOSINOPHIL # BLD: 0.25 K/UL (ref 0.04–0.36)
EOSINOPHIL NFR BLD: 2.3 %
ERYTHROCYTE [DISTWIDTH] IN BLOOD BY AUTOMATED COUNT: 16.5 % (ref 11.7–14.4)
GFR SERPL CREATININE-BSD FRML MDRD: 47.1 ML/MIN/1.73M*2
GLUCOSE SERPL-MCNC: 96 MG/DL (ref 70–99)
HCT VFR BLDCO AUTO: 32.8 % (ref 35–45)
HGB BLD-MCNC: 10 G/DL (ref 11.8–15.7)
IMM GRANULOCYTES # BLD AUTO: 0.05 K/UL (ref 0–0.08)
IMM GRANULOCYTES NFR BLD AUTO: 0.5 %
INR PPP: 2.6
LYMPHOCYTES # BLD: 1.31 K/UL (ref 1.2–3.5)
LYMPHOCYTES NFR BLD: 12.1 %
MAGNESIUM SERPL-MCNC: 2.4 MG/DL (ref 1.8–2.5)
MCH RBC QN AUTO: 25.2 PG (ref 28–33.2)
MCHC RBC AUTO-ENTMCNC: 30.5 G/DL (ref 32.2–35.5)
MCV RBC AUTO: 82.6 FL (ref 83–98)
MONOCYTES # BLD: 0.92 K/UL (ref 0.28–0.8)
MONOCYTES NFR BLD: 8.5 %
NEUTROPHILS # BLD: 8.3 K/UL (ref 1.7–7)
NEUTS SEG NFR BLD: 76.3 %
NRBC BLD-RTO: 0 %
PDW BLD AUTO: 10.8 FL (ref 9.4–12.3)
PLATELET # BLD AUTO: 261 K/UL (ref 150–369)
POTASSIUM SERPL-SCNC: 5 MEQ/L (ref 3.6–5.1)
PROTHROMBIN TIME: 27.2 SEC (ref 12.2–14.5)
RBC # BLD AUTO: 3.97 M/UL (ref 3.93–5.22)
SODIUM SERPL-SCNC: 141 MEQ/L (ref 136–144)
WBC # BLD AUTO: 10.86 K/UL (ref 3.8–10.5)

## 2022-09-02 PROCEDURE — 25000000 HC PHARMACY GENERAL

## 2022-09-02 PROCEDURE — 25000000 HC PHARMACY GENERAL: Performed by: STUDENT IN AN ORGANIZED HEALTH CARE EDUCATION/TRAINING PROGRAM

## 2022-09-02 PROCEDURE — 36415 COLL VENOUS BLD VENIPUNCTURE: CPT

## 2022-09-02 PROCEDURE — 83735 ASSAY OF MAGNESIUM: CPT

## 2022-09-02 PROCEDURE — 63600000 HC DRUGS/DETAIL CODE

## 2022-09-02 PROCEDURE — 92526 ORAL FUNCTION THERAPY: CPT | Mod: GN

## 2022-09-02 PROCEDURE — 63700000 HC SELF-ADMINISTRABLE DRUG: Performed by: HOSPITALIST

## 2022-09-02 PROCEDURE — 99239 HOSP IP/OBS DSCHRG MGMT >30: CPT | Performed by: INTERNAL MEDICINE

## 2022-09-02 PROCEDURE — 85610 PROTHROMBIN TIME: CPT

## 2022-09-02 PROCEDURE — 25800000 HC PHARMACY IV SOLUTIONS

## 2022-09-02 PROCEDURE — 63700000 HC SELF-ADMINISTRABLE DRUG: Performed by: STUDENT IN AN ORGANIZED HEALTH CARE EDUCATION/TRAINING PROGRAM

## 2022-09-02 PROCEDURE — 63700000 HC SELF-ADMINISTRABLE DRUG

## 2022-09-02 PROCEDURE — 80048 BASIC METABOLIC PNL TOTAL CA: CPT

## 2022-09-02 PROCEDURE — 85025 COMPLETE CBC W/AUTO DIFF WBC: CPT

## 2022-09-02 RX ORDER — AZITHROMYCIN 250 MG/1
250 TABLET, FILM COATED ORAL ONCE
Qty: 1 TABLET | Refills: 0 | Status: SHIPPED | OUTPATIENT
Start: 2022-09-03 | End: 2022-09-03

## 2022-09-02 RX ORDER — TORSEMIDE 20 MG/1
20 TABLET ORAL DAILY
Qty: 30 TABLET | Refills: 0 | Status: SHIPPED | OUTPATIENT
Start: 2022-09-02 | End: 2022-09-02 | Stop reason: HOSPADM

## 2022-09-02 RX ORDER — CEFUROXIME AXETIL 500 MG/1
500 TABLET ORAL 2 TIMES DAILY
Qty: 6 TABLET | Refills: 0 | Status: SHIPPED | OUTPATIENT
Start: 2022-09-03 | End: 2022-09-06

## 2022-09-02 RX ORDER — AMOXICILLIN 250 MG
1 CAPSULE ORAL DAILY
Qty: 30 TABLET | Refills: 0 | Status: SHIPPED | OUTPATIENT
Start: 2022-09-02 | End: 2022-10-02

## 2022-09-02 RX ORDER — FUROSEMIDE 20 MG/1
20 TABLET ORAL DAILY
Qty: 30 TABLET | Refills: 0 | Status: SHIPPED | OUTPATIENT
Start: 2022-09-03 | End: 2022-10-03

## 2022-09-02 RX ORDER — ROSUVASTATIN CALCIUM 10 MG/1
10 TABLET, COATED ORAL NIGHTLY
Qty: 30 TABLET | Refills: 0 | Status: SHIPPED | OUTPATIENT
Start: 2022-09-02 | End: 2022-10-02

## 2022-09-02 RX ORDER — AZITHROMYCIN 250 MG/1
500 TABLET, FILM COATED ORAL DAILY
Status: DISCONTINUED | OUTPATIENT
Start: 2022-09-02 | End: 2022-09-02

## 2022-09-02 RX ORDER — FERROUS SULFATE 325(65) MG
325 TABLET ORAL
Status: DISCONTINUED | OUTPATIENT
Start: 2022-09-03 | End: 2022-09-02 | Stop reason: HOSPADM

## 2022-09-02 RX ORDER — ONDANSETRON 4 MG/1
4 TABLET, ORALLY DISINTEGRATING ORAL EVERY 12 HOURS PRN
Qty: 6 TABLET | Refills: 0 | Status: SHIPPED | OUTPATIENT
Start: 2022-09-02 | End: 2022-09-05

## 2022-09-02 RX ORDER — FERROUS SULFATE 325(65) MG
325 TABLET ORAL EVERY OTHER DAY
Qty: 30 TABLET | Refills: 0
Start: 2022-09-02 | End: 2022-10-02

## 2022-09-02 RX ORDER — WARFARIN 2.5 MG/1
2.5 TABLET ORAL ONCE
Status: DISCONTINUED | OUTPATIENT
Start: 2022-09-02 | End: 2022-09-02 | Stop reason: HOSPADM

## 2022-09-02 RX ORDER — AZITHROMYCIN 250 MG/1
500 TABLET, FILM COATED ORAL DAILY
Status: DISCONTINUED | OUTPATIENT
Start: 2022-09-02 | End: 2022-09-02 | Stop reason: HOSPADM

## 2022-09-02 RX ORDER — FERROUS SULFATE 325(65) MG
325 TABLET ORAL
Qty: 30 TABLET | Refills: 0 | Status: SHIPPED | OUTPATIENT
Start: 2022-09-03 | End: 2022-09-02 | Stop reason: SDUPTHER

## 2022-09-02 RX ORDER — POLYETHYLENE GLYCOL 3350 17 G/17G
17 POWDER, FOR SOLUTION ORAL DAILY
Qty: 30 PACKET | Refills: 0 | Status: SHIPPED | OUTPATIENT
Start: 2022-09-02 | End: 2022-10-02

## 2022-09-02 RX ORDER — LORAZEPAM 0.5 MG/1
0.5 TABLET ORAL ONCE
Status: COMPLETED | OUTPATIENT
Start: 2022-09-02 | End: 2022-09-02

## 2022-09-02 RX ADMIN — LORAZEPAM 0.5 MG: 0.5 TABLET ORAL at 10:25

## 2022-09-02 RX ADMIN — IPRATROPIUM BROMIDE AND ALBUTEROL SULFATE 3 ML: .5; 3 SOLUTION RESPIRATORY (INHALATION) at 06:28

## 2022-09-02 RX ADMIN — TORSEMIDE 10 MG: 10 TABLET ORAL at 08:12

## 2022-09-02 RX ADMIN — DILTIAZEM HYDROCHLORIDE 120 MG: 120 CAPSULE, COATED, EXTENDED RELEASE ORAL at 08:12

## 2022-09-02 RX ADMIN — IPRATROPIUM BROMIDE AND ALBUTEROL SULFATE 3 ML: .5; 3 SOLUTION RESPIRATORY (INHALATION) at 13:00

## 2022-09-02 RX ADMIN — POLYETHYLENE GLYCOL 3350 17 G: 17 POWDER, FOR SOLUTION ORAL at 08:12

## 2022-09-02 RX ADMIN — AZITHROMYCIN 500 MG: 250 TABLET, FILM COATED ORAL at 13:00

## 2022-09-02 RX ADMIN — DILTIAZEM HYDROCHLORIDE 120 MG: 120 CAPSULE, COATED, EXTENDED RELEASE ORAL at 13:00

## 2022-09-02 RX ADMIN — HYDROMORPHONE HYDROCHLORIDE 2 MG: 2 TABLET ORAL at 03:05

## 2022-09-02 RX ADMIN — ONDANSETRON 4 MG: 4 TABLET, ORALLY DISINTEGRATING ORAL at 08:12

## 2022-09-02 RX ADMIN — TIOTROPIUM BROMIDE INHALATION SPRAY 2 PUFF: 3.12 SPRAY, METERED RESPIRATORY (INHALATION) at 09:04

## 2022-09-02 RX ADMIN — PREDNISONE 5 MG: 5 TABLET ORAL at 08:12

## 2022-09-02 RX ADMIN — SODIUM CHLORIDE 125 MG: 9 INJECTION, SOLUTION INTRAVENOUS at 09:04

## 2022-09-02 RX ADMIN — GUAIFENESIN 600 MG: 600 TABLET ORAL at 09:04

## 2022-09-02 RX ADMIN — CEFTRIAXONE SODIUM 1 G: 1 INJECTION, POWDER, FOR SOLUTION INTRAVENOUS at 08:21

## 2022-09-02 RX ADMIN — MOMETASONE FUROATE AND FORMOTEROL FUMARATE DIHYDRATE 2 PUFF: 200; 5 AEROSOL RESPIRATORY (INHALATION) at 06:30

## 2022-09-02 ASSESSMENT — COGNITIVE AND FUNCTIONAL STATUS - GENERAL
TAKING CARE OF COMPLICATED TASKS: 4 - NONE
REMEMBERING 5 ERRANDS WITH NO LIST: 4 - NONE
REMEMBERING WHERE THINGS ARE: 4 - NONE
FOLLOWS FAMILIAR CONVERSATION: 4 - NONE
REMEMBERING TO TAKE MEDICATION: 4 - NONE
UNDERSTANDING 10 TO 15 MIN SPEECH: 4 - NONE
AFFECT: WFL;ANXIOUS

## 2022-09-02 ASSESSMENT — ENCOUNTER SYMPTOMS
ABDOMINAL PAIN: 0
FEVER: 0
SPUTUM PRODUCTION: 1
SHORTNESS OF BREATH: 1
COUGH: 1
HEMOPTYSIS: 0

## 2022-09-02 NOTE — NURSING NOTE
Pt ordered for discharge. IV access and cardiac monitor d/c. All bedside belongings returned to pt. D/c instructions reviewed at length with pt, all questions answered. Pt escorted via wheelchair to main lobby with PCT.

## 2022-09-02 NOTE — ED PROVIDER NOTES
Emergency Medicine Note  HPI   HISTORY OF PRESENT ILLNESS     86 yo female with pmh of advanced COPD, CHF who presents with sob.  Was admitted and d/c from [facility] yesterday.  Is not feeling better.  Doesn't usually wear 02 during the day.  Has cough.  No fever/chills, no chest pain, no leg pain or swelling      History provided by:  Patient  Shortness of Breath  Severity:  Moderate  Onset quality:  Gradual  Timing:  Constant  Progression:  Unchanged  Chronicity:  Recurrent  Relieved by:  Nothing  Worsened by:  Exertion, coughing and activity  Ineffective treatments:  Oxygen  Associated symptoms: cough and sputum production    Associated symptoms: no abdominal pain, no chest pain, no fever and no hemoptysis          Patient History   PAST HISTORY     Reviewed from Nursing Triage:  Meds         No past medical history on file.    No past surgical history on file.    No family history on file.    Social History     Tobacco Use    Smoking status: Former Smoker     Types: Cigarettes    Smokeless tobacco: Former User    Tobacco comment: quit 40 years ago   Substance Use Topics    Alcohol use: Yes     Alcohol/week: 2.0 standard drinks     Types: 2 Standard drinks or equivalent per week    Drug use: No         Review of Systems   REVIEW OF SYSTEMS     Review of Systems   Constitutional: Negative for fever.   Respiratory: Positive for cough, sputum production and shortness of breath. Negative for hemoptysis.    Cardiovascular: Negative for chest pain.   Gastrointestinal: Negative for abdominal pain.   All other systems reviewed and are negative.        VITALS     ED Vitals    Date/Time Temp Pulse Resp BP SpO2 [facility]   08/30/22 1900 -- 83 20 152/65 99 %    08/30/22 1800 -- 86 20 137/62 97 % [facility]   08/30/22 1700 -- 83 20 148/67 100 %    08/30/22 1630 -- 86 20 146/71 98 % [facility]   08/30/22 1500 -- 85 22 140/66 -- K   08/30/22 1251 -- -- -- --  96 % SS   08/30/22 1250 -- 102 33 154/84 -- K   08/30/22 1247 37 °C (98.6  °F) 108 26 154/84 91 % SS                       Physical Exam   PHYSICAL EXAM     Physical Exam  Vitals and nursing note reviewed.   Constitutional:       General: She is not in acute distress.     Appearance: She is well-developed.   HENT:      Head: Normocephalic.      Mouth/Throat:      Mouth: Mucous membranes are moist.   Eyes:      Extraocular Movements: Extraocular movements intact.      Pupils: Pupils are equal, round, and reactive to light.   Cardiovascular:      Rate and Rhythm: Normal rate and regular rhythm.   Pulmonary:      Effort: Pulmonary effort is normal.      Breath sounds: Decreased breath sounds present.   Abdominal:      Palpations: Abdomen is soft.      Tenderness: There is no abdominal tenderness.   Musculoskeletal:         General: Normal range of motion.      Cervical back: Normal range of motion and neck supple.   Skin:     General: Skin is warm and dry.      Capillary Refill: Capillary refill takes less than 2 seconds.   Neurological:      General: No focal deficit present.      Mental Status: She is alert and oriented to person, place, and time.   Psychiatric:         Mood and Affect: Mood normal.           PROCEDURES     Procedures     DATA     Results     Procedure Component Value Units Date/Time    Blood Culture Blood, Venous [[id number]]  (Normal) Collected: 08/30/22 1642    Specimen: Blood, Venous Updated: 09/01/22 1901     Culture No growth at 48 hours    Blood Culture Blood, Venous [[id number]]  (Normal) Collected: 08/30/22 1642    Specimen: Blood, Venous Updated: 09/01/22 1901     Culture No growth at 48 hours    Lactate, w/ reflex repeat if > 2.0 [[id number]]  (Normal) Collected: 08/30/22 1642    Specimen: Blood, Venous Updated: 08/30/22 1656     Lactate 1.4 mmol/L     HS Troponin I (with 2 hour reflex) [[id number]]  (Normal) Collected: 08/30/22 1313    Specimen: Blood, Venous Updated: 08/30/22 1506     High Sens Troponin I 14 pg/mL      Comment: Result rechecked  Filtered to  eliminate fibrin       SARS-CoV-2 (COVID-19), PCR Nasopharynx [[id number]]  (Normal) Collected: 08/30/22 1322    Specimen: Nasopharyngeal Swab from Nasopharynx Updated: 08/30/22 1420    Narrative:      The following orders were created for panel order SARS-CoV-2 (COVID-19), PCR Nasopharynx.  Procedure                               Abnormality         Status                     ---------                               -----------         ------                     SARS-COV-2 (COVID-19)/ F...[[id number]]  Normal              Final result                 Please view results for these tests on the individual orders.    SARS-COV-2 (COVID-19)/ FLU A/B, AND RSV, PCR Nasopharynx [[id number]]  (Normal) Collected: 08/30/22 1322    Specimen: Nasopharyngeal Swab from Nasopharynx Updated: 08/30/22 1420     SARS-CoV-2 (COVID-19) Negative     Influenza A Negative     Influenza B Negative     Respiratory Syncytial Virus Negative    Narrative:      Testing performed using real-time PCR for detection of COVID-19. EUA approved validation studies performed on site.     Comprehensive metabolic panel [[id number]]  (Abnormal) Collected: 08/30/22 1313    Specimen: Blood, Venous Updated: 08/30/22 1402     Sodium 136 mEQ/L      Potassium 3.9 mEQ/L      Comment: Results obtained on plasma. Plasma Potassium values may be up to 0.4 mEQ/L less than serum values. The differences may be greater for patients with high platelet or white cell counts.        Chloride 92 mEQ/L      CO2 34 mEQ/L      BUN 15 mg/dL      Creatinine 1.0 mg/dL      Glucose 112 mg/dL      Calcium 9.0 mg/dL      AST (SGOT) 23 IU/L      ALT (SGPT) 15 IU/L      Alkaline Phosphatase 96 IU/L      Total Protein 7.0 g/dL      Comment: Test performed on plasma which typically contains approximately 0.4 g/dL more protein than serum.        Albumin 2.8 g/dL      Bilirubin, Total 1.0 mg/dL      eGFR 52.6 mL/min/1.73m*2      Anion Gap 10 mEQ/L     B-type natriuretic peptide [[id number]]   (Abnormal) Collected: 08/30/22 1313    Specimen: Blood, Venous Updated: 08/30/22 1400      pg/mL     Protime-INR [[id number]]  (Abnormal) Collected: 08/30/22 1322    Specimen: Blood, Venous Updated: 08/30/22 1357     PT 20.7 sec      INR 1.8     Comment: Moderate Intensity Anticoagulation = 2.0 to 3.0, High Intensity = 2.5 to 3.5       CBC and differential [[id number]]  (Abnormal) Collected: 08/30/22 1313    Specimen: Blood, Venous Updated: 08/30/22 1336     WBC 12.49 K/uL      RBC 4.28 M/uL      Hemoglobin 10.8 g/dL      Hematocrit 34.7 %      MCV 81.1 fL      MCH 25.2 pg      MCHC 31.1 g/dL      RDW 16.2 %      Platelets 265 K/uL      MPV 10.5 fL      Differential Type Auto     nRBC 0.0 %      Immature Granulocytes 0.6 %      Neutrophils 86.1 %      Lymphocytes 8.3 %      Monocytes 4.6 %      Eosinophils 0.2 %      Basophils 0.2 %      Immature Granulocytes, Absolute 0.07 K/uL      Neutrophils, Absolute 10.77 K/uL      Lymphocytes, Absolute 1.04 K/uL      Monocytes, Absolute 0.57 K/uL      Eosinophils, Absolute 0.02 K/uL      Basophils, Absolute 0.02 K/uL           Imaging Results          CT ANGIOGRAPHY CHEST PULMONARY EMBOLISM WITH IV CONTRAST (Final result)  Result time 08/30/22 18:53:29    Final result                 Impression:    IMPRESSION:  1.  No evidence of acute pulmonary embolism to the level of the proximal  segmental branches.  2.  Opacification of multiple airways in the right lower lobe beginning at the  right mainstem bronchus with dependent airspace opacity at the right lower lobe  which could represent aspiration pneumonia/pneumonitis/atelectasis.  3.  Widely  midline sternotomy as in comment.  4.  Severe age-indeterminate compression deformity at T7 and T9 with mild  retropulsion of the inferior vertebral body osseous fragments and mild spinal  canal narrowing at these levels.  5.  See comment for additional findings.                 Narrative:    CLINICAL HISTORY:  Pulmonary  embolism (PE) suspected, high prob    TECHNIQUE:   Enhanced CT Angiogram examination of the pulmonary arteries/chest  was performed from the lung apices through the adrenal glands including thin  slices according to acute pulmonary embolism protocol. 3D MIP and/or volume  rendered image reconstruction performed and reviewed. 62 mL Omnipaque 350 was  administered intravenously without event.    CT DOSE:  One or more dose reduction techniques (e.g. automated exposure  control, adjustment of the mA and/or kV according to patient size, use of  iterative reconstruction technique) utilized for this examination.    COMPARISON:  None available    COMMENT:    VESSELS: There is no evidence of acute pulmonary embolism to the level of the  proximal segmental branches. The main pulmonary artery is mildly prominent in  caliber, which may indicate pulmonary hypertension.  The thoracic aorta is normal in caliber with extensive atherosclerotic  calcification.    AIRWAYS, LUNGS, AND PLEURA: The central airways are patent. There is  opacification of multiple airways in the right lower lobe beginning at the right  mainstem bronchus with dependent airspace opacity at the right lower lobe which  could represent aspiration pneumonia/pneumonitis/atelectasis. There is no large  pleural effusion. There is no pneumothorax.  LOWER NECK: Visualized thyroid is unremarkable.  ESOPHAGUS: Within normal limits.  HEART AND MEDIASTINUM: There are postsurgical changes in the mediastinum with a  prosthetic aortic valve and CABG type postsurgical changes. There are extensive  coronary artery calcifications and there is mitral annular calcification. No  pericardial effusion.  LYMPH NODES: Within normal limits.  UPPER ABDOMEN: Limited visualization demonstrates vascular calcifications and  cholecystectomy clips.  CHEST WALL AND BONES: The sternum is widely  status post median  sternotomy and there is some herniation of the right lung into this  defect.  There are degenerative changes. There is age indeterminate severe compression  deformity at T7 and T9 with mild retropulsion of the inferior vertebral body  osseous fragments and mild spinal canal narrowing at these levels. There is  osteopenia with exaggerated kyphosis.                               X-RAY CHEST 1 VIEW (Final result)  Result time 08/30/22 14:06:30    Final result                 Impression:    IMPRESSION: Right basilar opacity.             Narrative:    CLINICAL HISTORY: Dyspnea.    COMMENT: A single frontal radiograph of the chest is obtained, and compared to  previous exam from 9/9/2018.    The heart is normal in size.  Atherosclerotic calcification of the thoracic aorta.  Right basilar opacity, not present on previous examination. No effusion or  pneumothorax.  Degenerative changes right shoulder.                                ECG 12 lead   Final Result      ECG 12 lead   Final Result      ECG 12 lead   Final Result          Scoring tools                                  ED Course & MDM   MDM / ED COURSE / CLINICAL IMPRESSION / DISPO     MDM       Clinical Impression      Pneumonia of right lower lobe due to infectious organism   Hypoxia     Disposition  Admit / Observation         Desirae Zimmerman MD  09/02/22 0004

## 2022-09-02 NOTE — PROGRESS NOTES
Warfarin Dosing by Pharmacy Consult Follow up    Bonny Howe is a 87 y.o. female who has been consulted for warfarin dosing for Valve replacement-bioprosthetic.    Reviewed relevant clinical data including weight, previous warfarin doses, CBC and PT/INR levels:  PT   Date/Time Value Ref Range Status   09/02/2022 0416 27.2 (H) 12.2 - 14.5 sec Final   09/01/2022 0613 24.2 (H) 12.2 - 14.5 sec Final   08/31/2022 0451 23.8 (H) 12.2 - 14.5 sec Final     INR   Date/Time Value Ref Range Status   09/02/2022 0416 2.6   Final     Comment:     Moderate Intensity Anticoagulation = 2.0 to 3.0, High Intensity = 2.5 to 3.5   09/01/2022 0613 2.2   Final     Comment:     Moderate Intensity Anticoagulation = 2.0 to 3.0, High Intensity = 2.5 to 3.5   08/31/2022 0451 2.2   Final     Comment:     Moderate Intensity Anticoagulation = 2.0 to 3.0, High Intensity = 2.5 to 3.5       CBC Results         09/02/22 09/01/22 08/31/22     0416 0613 0451    WBC 10.86 7.84 7.77    RBC 3.97 3.70 3.93    HGB 10.0 9.4 9.8    HCT 32.8 30.4 32.3    MCV 82.6 82.2 82.2    MCH 25.2 25.4 24.9    MCHC 30.5 30.9 30.3     244 251               Warfarin Administrations (last 96 hours)       Date/Time Action Medication Dose    09/01/22 1723 Given    warfarin (COUMADIN) tablet 2.5 mg 2.5 mg    08/31/22 1644 Given    warfarin (COUMADIN) tablet 2.5 mg 2.5 mg    08/30/22 2137 Given    warfarin (COUMADIN) tablet 2.5 mg 2.5 mg            Assessment/Plan  The patient is ordered warfarin dosing by pharmacy.      Pertinent medication interactions include: azithromycin, ceftriaxone     Warfarin INR level 2.6 after dose of 2.5 mg PO x1.  INR goal: 2 - 3    Will continue warfarin 2.5 mg PO x1.    Next INR:  9/3/22 @ 06:00    Patient is not receiving concurrent parenteral anticoagulation for bridging purposes.    Pharmacy will continue to follow the patient's warfarin dosing daily during this course of therapy.    Please call INR levels to the pharmacy.  Anita Batista  [place], [facility]

## 2022-09-02 NOTE — STUDENT
Spoke to family member (daughter Anna) on phone. Discussed plan for pt discharge and return to home. Updated preferred pharmacy, updated on brief hospital course, and answered questions to family member satisfaction.

## 2022-09-02 NOTE — PATIENT CARE CONFERENCE
Care Progression Rounds Note  Date: 9/2/2022  Time: 11:27 AM     Patient Name: Bonny Howe     Medical Record Number: [id number]   YOB: 1935  Sex: Female      Room/Bed: 0165    Admitting Diagnosis: Pneumonia of right lower lobe due to infectious organism [J18.9]   Admit Date/Time: 8/30/2022 12:42 PM    Primary Diagnosis: Pneumonia of right lower lobe due to infectious organism  Principal Problem: Pneumonia of right lower lobe due to infectious organism    GMLOS: 3.1  Anticipated Discharge Date: 9/2/2022    AM-PAC:  Mobility Score: 24    Discharge Planning:  Current Living Arrangements: independent living facility  Concerns to be Addressed: care coordination/care conferences, discharge planning, home safety  Anticipated Discharge Disposition: home without assistance or services  Type of Home Care Services: nursing, home PT, home health aide  Type of Skilled Nursing Care Services: PT, nursing    Barriers to Discharge:  None    Comments:       Participants:  , physical therapy, physician, nursing, occupational therapy, social work/services

## 2022-09-02 NOTE — DISCHARGE INSTRUCTIONS
Dear MsScott Shyam    You were admitted to [facility] for shortness of breath were found to have pneumonia.  You were treated with IV antibiotics and then switched to oral antibiotics to complete a 7-day course.    You were also found to have iron deficiency anemia for which you were started on intravenous iron. You should continue to take iron pills at home.    You were also noted to have some compression deformities in your T7 and T9 vertebrae likely with your history of osteoporosis. You will need to follow up as an outpatient with your PCP or endocrinologist    You will have to follow up with your lung doctor to discuss the need to restart prednisone for your COPD     Important information on discharge    Please continue to use your oxygen nightly.  If you are finding that you need to use her oxygen during the day, please contact your pulmonologist to discuss further.    Please continue to take Miralax and Senna-docusate for constipation, especially while taking dilaudid. If you have loose stools or diarrhea, please hold.      You may experience nausea with constipation or the use of antibiotics. You can use zofran 1-2 times a day as needed only for nausea. If you are requiring frequently, please contact your PCP     If you experience worsening shortness of breath, dizziness, chest pain, please report back to the emergency department.    Important medication changes  Please refer to the after visit summary for important medication changes    Important follow up appointments  - Please schedule an appointment with your PCP in 1-2 weeks.  Please discuss with PCP further work-up of your anemia including possible colonoscopy if not done previously as an outpatient   - Please schedule an outpatient appointment with Dr. Bailey (cardiologist) to discuss your diuretic dosing   - Please schedule follow-up appointment with your pulmonologist at [place] and discuss your prednisone dosing   - Please follow up  with Dr Pittman from endocrinology for your osteoporosis.     Thank you for allowing us to be a part of your care here at [facility].     Sincerely,   [facility] Team

## 2022-09-02 NOTE — PROGRESS NOTES
Patient:  Bonny Howe  Location:  [place] [address]  MRN:  [id number]  Today's date:  9/2/2022  Speech Pathology: Therapy session    SLP Diagnosis: Grossly functional oral stage, no signs or symptoms of aspiration noted at the bedside, reduced oxygen demands, patient is deferring video swallow at this time    Recommendations:  1. Continue with L7-regular solids/L0-thin liquids, medication per pt preference   2. General aspiration precautions  3. Concern for possible silent aspiration in setting of admission with RLL PNA, absent s/sx of aspiration observed at bedside. SLP provided extensive education to pt for benefits of VFSS, however, at this time pt wishes to defer. If concerns arise-consider outpatient VFSS.  4. At this time, as the pt is showing no overt s/sx of aspiration, oxygen demands are reduced from time of admission, and patient is refusing VFSS completion, there are no further acute ST needs at this time. SLP to sign-off. If new concerns arise, please re-consult SLP.       Summary/Handoff:  Daily Outcome Statement: Concern for possible silent aspiration.  SLP provided patient education regarding recommendation for video swallow to objectively assess swallow function and rule out silent aspiration as cause of right lower lobe pneumonia.  At this time, patient is refusing completion of video swallow if she does not feel she has dysphagia.  SLP provided education for signs and symptoms of aspiration, signs and symptoms of aspiration pneumonia of which to be aware.  Patient reports good understanding.  SLP provided further information mention regarding options for video swallow as an outpatient if concerns arise.  At this time, as the patient is showing no signs or symptoms of aspiration at the bedside, appears to have reduced oxygen demands from time of admission and is deferring video swallow, speech therapy to sign off.  If new concerns arise please reconsult speech.      Session  Notes:     GRBAS: GRBAS was administered.  NO voice impairment appreciated as Quality/Pitch/Intensity appear intact.           Dietary Orders   (From admission, onward)             Start     Ordered    09/02/22 0953  Diet message  Once        Comments: Please send milk of molasses enema ASAP, thank you    09/02/22 0953    08/31/22 1229  Adult Diet Regular; Thin Liquids; Cardiac (Low Sodium/Low Fat); RD/LDN may adjust order  Diet effective now        References:    IDDSI Diet reference   Question Answer Comment   Diet Texture Regular    Fluid Consistency: Thin Liquids    Other Restriction(s): Cardiac (Low Sodium/Low Fat)    Delegation of Authority. Diet orders written by PA/CRNPs may not be adjusted by RD/LDNs. RD/LDN may adjust order        08/31/22 1228                Results from last 7 days   Lab Units 09/02/22  0416 09/01/22  0613 08/31/22  0451   WBC K/uL 10.86* 7.84 7.77   HEMOGLOBIN g/dL 10.0* 9.4* 9.8*   HEMATOCRIT % 32.8* 30.4* 32.3*   PLATELETS K/uL 261 244 251          RN cleared SLP to assess/work with patient as no medical issues identified to preclude this therapy session. Following completion of session, pt remained in chair as they were found with call bell in reach.  Nurse was made aware of patients performance and any changes in status (if applicable).      Bonny is a 87 y.o. female admitted on 8/30/2022 with Pneumonia of right lower lobe due to infectious organism [J18.9]. Principal problem is Pneumonia of right lower lobe due to infectious organism.    Past Medical History  Bonny has no past medical history on file.    History of Present Illness   PNA      SLP Pain    Date/Time Pain Type Rating: Rest Rating: Activity Who   09/02/22 0852 Pain Assessment 0 0 ERIK          Prior Living Environment    Flowsheet Row Most Recent Value   Current Living Arrangements independent living facility   Living Environment Comment pt lives in independent living facility alone, 0STE, elevator access, stall  shower with grab bar and shower chair        Prior Level of Function    Flowsheet Row Most Recent Value   Dominant Hand right   Ambulation assistive equipment   Transferring independent   Toileting independent   Bathing independent   Dressing independent   Eating independent   Communication understands/communicates without difficulty   Swallowing swallows foods/liquids without difficulty   Baseline Diet/Method of Nutritional Intake regular, thin liquids   Past History of Dysphagia patient denies prior dysphagia hx   Assistive Device Currently Used at Home walker, front-wheeled           SLP Evaluation and Treatment - 09/02/22 0852        SLP Time Calculation    Start Time 0852     Stop Time 0904     Time Calculation (min) 12 min        Session Details    Document Type daily treatment/progress note     Mode of Treatment speech language pathology;individual therapy        General Information    Patient Profile Reviewed yes     Patient/Family/Caregiver Comments/Observations RN reports no c/f dysphagia with this patient at this time     General Observations of Patient Pt was received in the chair, completion care with RN     Existing Precautions/Restrictions aspiration;fall     Limitations/Impairments swallowing        Cognition/Psychosocial    Affect/Mental Status (Cognition) WFL;anxious     Orientation Status (Cognition) oriented x 4     Comment, Cognition Pt appears anxious, but is able to follow simple commands and answer simple questions.        Vocal Quality/Secretion Management (Oral Motor)    Vocal Quality (Oral Motor) WFL        Motor Speech    Speech Intelligibility (Motor Speech) WFL        Auditory Comprehension    Comment, Intervention (Auditory Comprehension) pt is able to follow simple commands and answer simple questions effectively within context of this dysphagia follow-up        Verbal Expression    Comment, Intervention (Verbal Expression) the pt presents with fluent speech, she is able to state  wants and needs effectviely        Functional Communication Measures    FCM: Swallowing 7-->Level 7        General Swallowing Observations    Current Diet/Method of Nutritional Intake thin liquids;regular     Respiratory Support (General Swallowing Observations) none   RA 92-96% throughout session    Comment, Secretions/Suctioning unremarkable     Comment, General Swallowing Observations Pt received upright in the chair. Denies dysphagia. SLP provided information er: c/f possible aspiration in setting fo RLL infiltrates. Pt reports understanding. Pt no longer requiring oxygen, sating > 92% on RA. RN and patient deny overt s/sx of aspiration with intake. SLP provided ifnormation re: VFSS to objectively assess swallow function, rule-out sient aspiration (in setting of RLL infiltrates), however, pt defers at this time. SLP assessed patient briefly at the bedside        Food and Liquid Trials (NIS)    Patient Positioning HOB elevated (specify degrees)     Oral Intake/Feeding Performance independent/appropriate self-feeding skills     Liquid Consistencies Evaluated thin liquids     Thin Liquids straw sips;patient-controlled amounts     Comment, Thin Liquids no overt s/sx of aspiration, clear VQ throughout trials     Food Consistencies Evaluated regular     Regular intact     Oral Preparatory Phase of Swallow WFL     Oral Phase of Swallow WFL     Pharyngeal Phase of Swallow no clinical symptoms     Esophageal Phase of Swallow no clinical symptoms     SpO2 Level pt currently on RA 9baseline 2.5L in setting of COPD?)     Comment Pt defering VFSS at this time, SLP provided education on s/sx of aspiration, aspiration PNA of which to be aware and option of OP VFSS if pt has concerns. Pt reports understanding, no further acute ST at this time as pt does not wish to pursue VFSS at this time        Swallowing Recommendations    Diet Consistency Recommendations regular;thin liquids     Medication Administration whole with liquid      Instrumental Assessment Recommendations --   pt defering IP VFSS at this time    Comment, Swallowing Recommendations General aspiration and GERD precautions        AM-PAC (TM) - Cognition (Current Function)    Following/understanding a 10-15 minute speech or presentation? 4 - None     Understanding familiar people during ordinary conversations? 4 - None     Remembering to take medications at the appropriate time? 4 - None     Remembering where things were placed or put away? 4 - None     Remembering a list of 3 or 4 errands without writing it down? 4 - None     Taking care of complicated tasks? 4 - None     AM-PAC (TM) Cognition Score 24        Assessment/Plan (SLP)    Daily Outcome Statement Concern for possible silent aspiration.  SLP provided patient education regarding recommendation for video swallow to objectively assess swallow function and rule out silent aspiration as cause of right lower lobe pneumonia.  At this time, patient is refusing completion of video swallow if she does not feel she has dysphagia.  SLP provided education for signs and symptoms of aspiration, signs and symptoms of aspiration pneumonia of which to be aware.  Patient reports good understanding.  SLP provided further information mention regarding options for video swallow as an outpatient if concerns arise.  At this time, as the patient is showing no signs or symptoms of aspiration at the bedside, appears to have reduced oxygen demands from time of admission and is deferring video swallow, speech therapy to sign off.  If new concerns arise please reconsult speech.              SLP Assessment/Plan    Flowsheet Row Most Recent Value   SLP Diagnosis Grossly functional oral stage, no signs or symptoms of aspiration noted at the bedside, reduced oxygen demands, patient is deferring video swallow at this time at 09/02/2022 0852   Patient/Family Therapy Goal Statement I want my lunch at 08/31/2022 1140               Education  Documentation  Unresolved/Worsening Symptoms, taught by Constance Patterson CCC-SLP at 9/2/2022 11:27 AM.  Learner: Patient  Readiness: Acceptance  Method: Explanation  Response: Verbalizes Understanding  Comment: re: aspiration risks, s/sx of aspiration, s/sx of aspiration PNA. Option for VFSS to objectively assess swallow-deferring as IP, will follow-up as OP if needed. Pt in agreement with no further acute ST needs.          SLP Goals    Flowsheet Row Most Recent Value   Oral Nutrition/Hydration Goal    Oral Nutrition/Hydration Goal Determine need for further instrumental assessment at 08/31/2022 1140   Time Frame by discharge at 08/31/2022 1140   Strategies/Barriers dysphagia, RLL pnuemonia, at 08/31/2022 1140   Progress/Outcome goal met at 09/02/2022 0852

## 2022-09-02 NOTE — STUDENT
Internal Medicine  Daily Progress Note       SUBJECTIVE   This is a 87 y.o. year-old female admitted on 2022 with Pneumonia of right lower lobe due to infectious organism [J18.9].    Interval History: pt is being treated for pneumonia. Pt states subjective improvement today and is ready to go home. Self-weaned from O2 for 15 min this AM with no increased dyspnea or work of breathing. Pt has not had a BM today and says she feels constipated. Pt wants to go home as she feels better. No acute overnight events.      D/C info: meds to [facility] [place]  Pulmonologist: [facility]  CV: Dr. Bailey, also requests Amati at [facility]      OBJECTIVE      Vital signs in last 24 hours:  Temp:  [36.4 °C (97.5 °F)-36.6 °C (97.8 °F)] 36.6 °C (97.8 °F)  Heart Rate:  [] 100  Resp:  [18-20] 18  BP: (118-161)/(56-71) 153/64  Temp (24hrs), Av.5 °C (97.7 °F), Min:36.4 °C (97.5 °F), Max:36.6 °C (97.8 °F)    Intake/Output    None         PHYSICAL EXAMINATION      Physical Exam  VS: .71 HR  RR 20 93% RA.   General Survey: Pt is alert. Able to maintain natural conversation and eye contact with appropriate responses and questioning.  Vital signs: as documented above.  Skin: no jaundice or visible rashes.  Head: normocephalic, atraumatic.  Eyes: pupils equal and round.  Ears: able to understand conversation at natural volume. Atraumatic, no external discharge.  Nose: no external discharge.  Throat: no swelling or erythema.  Neck: trachea midline, no stridor, JVD, or bruits bilaterally.  Lymph nodes: no enlarged lymph nodes noted.  Thorax and Lungs: breath sounds CTA x 2 fields B/L with slight expiratory wheeze. No other adventitious breath sounds. Chest rise equal with no crepitus.  Cardiovascular: + S1 + S2 click - S2 - S4. No murmurs, gallops, or rubs noted to auscultation.  Breasts: deferred.  Abdomen: soft and non-tender with no pain to palpation x 4 quadrants. No distention, guarding, or rebound pain. Bowel  sounds auscultated in lower right quadrant.  Genitalia: deferred.  Rectal: deferred.  Peripheral vascular: no edema.  Musculoskeletal: strength equal in all 4 limbs.  Neurologic: awake, alert, and oriented x4 to person, place, time, and event. Sensation to light touch and pressure preserved x4 limbs.     LINES, CATHETERS, DRAINS, AIRWAYS, AND WOUNDS   Lines, Drains, Airways, Wounds:  Peripheral IV (Adult) 09/01/22 Anterior;Right Forearm (Active)   Number of days: 1       Comments:      LABS / IMAGING / TELE      Labs  I have reviewed the patient's pertinent labs. Pertinent labs are within normal limits.   Hgb improving after iron supplements. INR 2.6.    Imaging personally reviewed(does not include unread studies):  CT ANGIOGRAPHY CHEST PULMONARY EMBOLISM WITH IV CONTRAST    Result Date: 8/30/2022  IMPRESSION: 1.  No evidence of acute pulmonary embolism to the level of the proximal segmental branches. 2.  Opacification of multiple airways in the right lower lobe beginning at the right mainstem bronchus with dependent airspace opacity at the right lower lobe which could represent aspiration pneumonia/pneumonitis/atelectasis. 3.  Widely  midline sternotomy as in comment. 4.  Severe age-indeterminate compression deformity at T7 and T9 with mild retropulsion of the inferior vertebral body osseous fragments and mild spinal canal narrowing at these levels. 5.  See comment for additional findings.     X-RAY CHEST 1 VIEW    Result Date: 8/30/2022  IMPRESSION: Right basilar opacity.      ECG/Telemetry  Sinus with PACs    ASSESSMENT AND PLAN     Pneumonia:  Acute, improving  Will continue treatment with ceftriaxone and azithromycin and bridge for discharge.      COPD:  Chronic, unchanged.  Continue LABA+LAMA+ICS to manage. Encouraged incentive spirometer use. Pt should be in chair when possible and should walk today. Pt O2 sat 92-94% today without oxygen for 15 min.     HF:  Chronic, improved.  No signs of edema or  fluid retention due to recent diuresis at hospital stay. Will monitor dry weights.     AVR:  Chronic, unchanged.  Will maintain therapeutic INR of 2.0-3.0. 2.6 today.     HTN:  Chronic, unchanged.  Maintain diltiazem.     Anemia:  Unknown, improving  Iron studies indicate possible iron-deficiency. Will replete iron IV x 5 days and monitor. Fe was started on 8/31. Hgb today improving     CKD stage 3a  Chronic, unchanged.  eGFR and Cr at baseline. Will monitor and avoid unnecessary nephrotoxic medications.     Constipation and nausea:  Pt remains subjectively constipated. Continue bowel regemin. Pt has required PRN ondansetron PO this morning and 9/1 morning for nausea. Able to eat full breakfast w/o vomiting.       Anemia  Assessment & Plan  Hgb 10.8 (baseline 9-10s)  No active bleeding  Iron studies - iron + sat low, ferritin low-normal  B12 + folate normal    - Trend with daily CBC  - IV iron 5 days      Vitamin D deficiency  Assessment & Plan  Hx of vitamin D deficiency and osteoporosis   Follows at [facility] with Severe age-indeterminate compression deformity at T7 and T9 with mild   retropulsion of the inferior vertebral body osseous fragments and mild spinal   canal narrowing at these levels.     - outpatient follow up for osteoporosis   - follow up vitamin D level  - dilaudid PRN home dose for pain       H/O aortic valve replacement  Assessment & Plan  Hx of CABG and AVR   Previously followed at [place] and has cardiologist at [facility]   On warfarin     - continue warfarin by pharmacy protocol   - on diltiazem 3 times daily for blood pressure and rate control     COPD (chronic obstructive pulmonary disease) (CMS/[facility])  Assessment & Plan  Patient with history of recurrent COPD exacerbations and restrictive lung disease.  She is on trelegy   On home oxygen 2.5 L nightly however has been taking continuously last 2 weeks per her pulm request   Last COPD exacerbation requiring steroids 7/2022  Follows with  pulm at [place]   Unable to bring her Trelegy   Non wheezy on exam initially. Do not suspect an acute COPD exacerbation     - Maintenance inhaler- Spiriva and Dulera  - Duoneb q6 PRN   - vbg to assess CO2      Anxiety disorder, unspecified  Assessment & Plan  Continue zolpidem nightly   PRN ativan     * Pneumonia of right lower lobe due to infectious organism  Assessment & Plan  Patient with worsening shortness of breath over the last week, increased phlegm on background hx of COPD   Covid negative; lactate wnl    CXR R basilar opacity   CTPE: no evidence of PE to prox seg branches, RLL opacification PNA/pneumonitis/atelectasis, midline sternotomy, compression deformity T7/T9 with mild retropulsion  SLP no aspiration risk    - Continue CTX and azithromycin for 5 days   - Trend CBC   - Trend fever curve  - Hold off on steroids   - Incentive spirometry given restrictive lung physiology   - f/u sputum and blood culture (NGTD), urine legionella   - Continue torsemide - monitor for worsening renal function   - Strict I/O, daily standing weights         VTE Assessment: Padua    VTE Prophylaxis: Current anticoagulants:  warfarin (COUMADIN) therapy by pharmacy protocol, Other, evaluate daily      Code Status: Limited Code  Estimated discharge date: 9/2/2022     ATTENDING DOCUMENTATION  ALSO SEE ATTENDING ATTESTATION SECTION OF NOTE

## 2022-09-02 NOTE — PLAN OF CARE
Problem: Adult Inpatient Plan of Care  Goal: Plan of Care Review  Outcome: Progressing  Flowsheets (Taken 9/2/2022 0336)  Progress: improving  Plan of Care Reviewed With: patient  Outcome Summary: Pt A&0x4. Complaining of pain, given PO dilaudid. VSS. NS. Pt OOB to the bathroom no issues. Pt maintained @ 2.5L O2 NC 95%. IV abx maintained. Call bell within reach. Continuing to monitor.  Goal: Patient-Specific Goal (Individualized)  Outcome: Progressing  Flowsheets (Taken 9/2/2022 0336)  Patient-Specific Goals (Include Timeframe): d/c when stable  Individualized Care Needs: assist w/ ADLs  Anxieties, Fears or Concerns: none  Goal: Absence of Hospital-Acquired Illness or Injury  Outcome: Progressing  Goal: Optimal Comfort and Wellbeing  Outcome: Progressing   Plan of Care Review  Plan of Care Reviewed With: patient  Progress: improving  Outcome Summary: Pt A&0x4. Complaining of pain, given PO dilaudid. VSS. NS. Pt OOB to the bathroom no issues. Pt maintained @ 2.5L O2 NC 95%. IV abx maintained. Call bell within reach. Continuing to monitor.

## 2022-09-02 NOTE — PLAN OF CARE
Problem: Adult Inpatient Plan of Care  Goal: Plan of Care Review  Flowsheets (Taken 9/2/2022 1202)  Progress: improving  Plan of Care Reviewed With: patient  Patient discussed in care progression rounds, patient is stable for discharge home today with [facility]. Family will transport home. Will continue to follow for transitional care needs.

## 2022-09-03 LAB
1,25(OH)2D SERPL-MCNC: 48 PG/ML (ref 18–72)
1,25(OH)2D2 SERPL-MCNC: <8 PG/ML
1,25(OH)2D3 SERPL-MCNC: 48 PG/ML

## 2022-09-04 LAB
BACTERIA BLD CULT: NORMAL
BACTERIA BLD CULT: NORMAL

## 2023-03-07 PROCEDURE — 80053 COMPREHEN METABOLIC PANEL: CPT | Performed by: INTERNAL MEDICINE

## 2023-03-07 PROCEDURE — 85027 COMPLETE CBC AUTOMATED: CPT | Performed by: INTERNAL MEDICINE

## 2023-03-07 PROCEDURE — 85610 PROTHROMBIN TIME: CPT | Performed by: INTERNAL MEDICINE

## 2023-03-10 PROCEDURE — 85610 PROTHROMBIN TIME: CPT | Performed by: INTERNAL MEDICINE

## 2023-03-10 PROCEDURE — 85027 COMPLETE CBC AUTOMATED: CPT | Performed by: INTERNAL MEDICINE

## 2023-03-11 PROCEDURE — 85018 HEMOGLOBIN: CPT | Performed by: INTERNAL MEDICINE

## 2023-03-13 PROCEDURE — 85027 COMPLETE CBC AUTOMATED: CPT | Performed by: INTERNAL MEDICINE

## 2023-03-13 PROCEDURE — 85610 PROTHROMBIN TIME: CPT | Performed by: INTERNAL MEDICINE

## 2023-03-13 PROCEDURE — 80048 BASIC METABOLIC PNL TOTAL CA: CPT | Performed by: INTERNAL MEDICINE

## 2023-03-14 PROCEDURE — 85027 COMPLETE CBC AUTOMATED: CPT | Performed by: INTERNAL MEDICINE

## 2023-03-16 PROCEDURE — 85610 PROTHROMBIN TIME: CPT | Performed by: INTERNAL MEDICINE

## 2023-03-16 PROCEDURE — 85027 COMPLETE CBC AUTOMATED: CPT | Performed by: INTERNAL MEDICINE

## 2023-03-16 PROCEDURE — 80048 BASIC METABOLIC PNL TOTAL CA: CPT | Performed by: INTERNAL MEDICINE

## 2023-03-20 PROCEDURE — 85610 PROTHROMBIN TIME: CPT | Performed by: INTERNAL MEDICINE

## 2023-03-21 PROCEDURE — 85610 PROTHROMBIN TIME: CPT | Performed by: INTERNAL MEDICINE

## 2023-03-21 PROCEDURE — 85027 COMPLETE CBC AUTOMATED: CPT | Performed by: INTERNAL MEDICINE

## 2023-03-21 PROCEDURE — 80048 BASIC METABOLIC PNL TOTAL CA: CPT | Performed by: INTERNAL MEDICINE
